# Patient Record
Sex: FEMALE | Race: BLACK OR AFRICAN AMERICAN | NOT HISPANIC OR LATINO | Employment: OTHER | URBAN - METROPOLITAN AREA
[De-identification: names, ages, dates, MRNs, and addresses within clinical notes are randomized per-mention and may not be internally consistent; named-entity substitution may affect disease eponyms.]

---

## 2018-08-18 PROBLEM — T14.8XXA DEEP TISSUE INJURY: Status: ACTIVE | Noted: 2018-08-18

## 2018-08-18 PROBLEM — L89.154 SACRAL DECUBITUS ULCER, STAGE IV: Status: ACTIVE | Noted: 2018-08-18

## 2018-08-18 PROBLEM — E11.9 TYPE 2 DIABETES MELLITUS: Status: ACTIVE | Noted: 2018-08-18

## 2018-08-18 PROBLEM — L89.154 DECUBITUS ULCER OF SACRAL REGION, STAGE 4: Status: ACTIVE | Noted: 2018-08-18

## 2018-08-18 PROBLEM — L89.90 DECUBITUS ULCER: Status: ACTIVE | Noted: 2018-08-18

## 2018-08-18 PROBLEM — E43 SEVERE MALNUTRITION: Status: ACTIVE | Noted: 2018-08-18

## 2018-08-18 PROBLEM — R53.81 PHYSICAL DECONDITIONING: Status: ACTIVE | Noted: 2018-08-18

## 2018-08-18 PROBLEM — M86.9 OSTEOMYELITIS: Status: ACTIVE | Noted: 2018-08-18

## 2018-08-18 PROBLEM — E78.49 OTHER HYPERLIPIDEMIA: Status: ACTIVE | Noted: 2018-08-18

## 2018-08-18 PROBLEM — S90.821A: Status: ACTIVE | Noted: 2018-08-18

## 2018-08-18 PROBLEM — E03.8 OTHER SPECIFIED HYPOTHYROIDISM: Status: ACTIVE | Noted: 2018-08-18

## 2018-08-18 PROBLEM — A41.89 SEPSIS DUE TO OTHER ETIOLOGY: Status: ACTIVE | Noted: 2018-08-18

## 2018-08-18 PROBLEM — L89.892: Status: ACTIVE | Noted: 2018-08-18

## 2018-08-18 PROBLEM — Z93.1 S/P PERCUTANEOUS ENDOSCOPIC GASTROSTOMY (PEG) TUBE PLACEMENT: Status: ACTIVE | Noted: 2018-08-08

## 2018-08-18 PROBLEM — E83.39 HYPOPHOSPHATEMIA: Status: ACTIVE | Noted: 2018-08-18

## 2018-08-18 PROBLEM — E11.40 DIABETIC NEUROPATHY ASSOCIATED WITH TYPE 2 DIABETES MELLITUS: Status: ACTIVE | Noted: 2018-08-18

## 2018-08-18 PROBLEM — I10 ESSENTIAL HYPERTENSION: Status: ACTIVE | Noted: 2018-08-18

## 2018-08-18 PROBLEM — F32.A DEPRESSION: Chronic | Status: ACTIVE | Noted: 2018-08-18

## 2018-08-18 PROBLEM — Z93.3 STATUS POST COLOSTOMY: Status: ACTIVE | Noted: 2018-08-08

## 2018-08-18 PROBLEM — R91.8 OPACITY OF LUNG ON IMAGING STUDY: Status: ACTIVE | Noted: 2018-08-18

## 2018-08-18 PROBLEM — F03.90 DEMENTIA WITHOUT BEHAVIORAL DISTURBANCE: Status: ACTIVE | Noted: 2018-08-18

## 2018-08-19 PROBLEM — E83.42 HYPOMAGNESEMIA: Status: ACTIVE | Noted: 2018-08-19

## 2018-08-19 PROBLEM — E87.6 HYPOKALEMIA: Status: ACTIVE | Noted: 2018-08-19

## 2018-08-20 PROBLEM — L89.313: Status: ACTIVE | Noted: 2018-08-20

## 2018-08-20 PROBLEM — L89.90 PRESSURE ULCER: Status: ACTIVE | Noted: 2018-08-20

## 2018-08-21 PROBLEM — L89.314 DECUBITUS ULCER OF RIGHT ISCHIUM, STAGE 4: Status: ACTIVE | Noted: 2018-08-20

## 2018-08-21 PROBLEM — L89.894: Status: ACTIVE | Noted: 2018-08-18

## 2018-08-22 PROBLEM — B35.3 TINEA PEDIS: Status: ACTIVE | Noted: 2018-08-22

## 2018-08-23 PROBLEM — E87.1 HYPONATREMIA: Status: ACTIVE | Noted: 2018-08-23

## 2018-08-24 PROBLEM — R68.89 SUSPECTED DEEP TISSUE INJURY OF UNKNOWN DEPTH OF HEEL: Status: ACTIVE | Noted: 2018-08-24

## 2018-08-27 PROBLEM — L89.313: Status: ACTIVE | Noted: 2018-08-27

## 2018-09-04 PROBLEM — E87.0 HYPERNATREMIA: Status: ACTIVE | Noted: 2018-09-04

## 2018-09-07 PROBLEM — D64.9 ANEMIA: Status: ACTIVE | Noted: 2018-09-07

## 2018-09-13 PROBLEM — L89.95 UNSTAGEABLE PRESSURE INJURY OF SKIN AND TISSUE: Status: ACTIVE | Noted: 2018-09-13

## 2018-09-25 ENCOUNTER — HOSPITAL ENCOUNTER (INPATIENT)
Facility: HOSPITAL | Age: 78
LOS: 3 days | Discharge: HOME | DRG: 698 | End: 2018-09-28
Attending: EMERGENCY MEDICINE | Admitting: INTERNAL MEDICINE
Payer: MEDICARE

## 2018-09-25 DIAGNOSIS — R41.82 ALTERED MENTAL STATUS, UNSPECIFIED ALTERED MENTAL STATUS TYPE: ICD-10-CM

## 2018-09-25 DIAGNOSIS — R53.1 WEAKNESS: ICD-10-CM

## 2018-09-25 DIAGNOSIS — Z66 DNR (DO NOT RESUSCITATE): ICD-10-CM

## 2018-09-25 DIAGNOSIS — G93.41 ACUTE METABOLIC ENCEPHALOPATHY: ICD-10-CM

## 2018-09-25 DIAGNOSIS — A41.9 SEPSIS, DUE TO UNSPECIFIED ORGANISM: ICD-10-CM

## 2018-09-25 DIAGNOSIS — N39.0 URINARY TRACT INFECTION WITHOUT HEMATURIA, SITE UNSPECIFIED: Primary | ICD-10-CM

## 2018-09-25 LAB
ALBUMIN SERPL BCP-MCNC: 2.3 G/DL
ALP SERPL-CCNC: 85 U/L
ALT SERPL W/O P-5'-P-CCNC: 25 U/L
ANION GAP SERPL CALC-SCNC: 8 MMOL/L
AST SERPL-CCNC: 26 U/L
BACTERIA #/AREA URNS AUTO: ABNORMAL /HPF
BASOPHILS # BLD AUTO: 0.03 K/UL
BASOPHILS NFR BLD: 0.4 %
BILIRUB SERPL-MCNC: 0.3 MG/DL
BILIRUB UR QL STRIP: NEGATIVE
BUN SERPL-MCNC: 31 MG/DL
CALCIUM SERPL-MCNC: 9.6 MG/DL
CHLORIDE SERPL-SCNC: 96 MMOL/L
CLARITY UR REFRACT.AUTO: ABNORMAL
CO2 SERPL-SCNC: 25 MMOL/L
COLOR UR AUTO: ABNORMAL
CREAT SERPL-MCNC: 0.7 MG/DL
CRP SERPL-MCNC: 72.7 MG/L
DIFFERENTIAL METHOD: ABNORMAL
EOSINOPHIL # BLD AUTO: 0.3 K/UL
EOSINOPHIL NFR BLD: 3.7 %
ERYTHROCYTE [DISTWIDTH] IN BLOOD BY AUTOMATED COUNT: 17.6 %
ERYTHROCYTE [SEDIMENTATION RATE] IN BLOOD BY WESTERGREN METHOD: 65 MM/HR
EST. GFR  (AFRICAN AMERICAN): >60 ML/MIN/1.73 M^2
EST. GFR  (NON AFRICAN AMERICAN): >60 ML/MIN/1.73 M^2
GLUCOSE SERPL-MCNC: 112 MG/DL
GLUCOSE UR QL STRIP: NEGATIVE
HCT VFR BLD AUTO: 31.4 %
HGB BLD-MCNC: 9.9 G/DL
HGB UR QL STRIP: NEGATIVE
IMM GRANULOCYTES # BLD AUTO: 0.03 K/UL
IMM GRANULOCYTES NFR BLD AUTO: 0.4 %
KETONES UR QL STRIP: NEGATIVE
LACTATE SERPL-SCNC: 0.7 MMOL/L
LACTATE SERPL-SCNC: 0.9 MMOL/L
LEUKOCYTE ESTERASE UR QL STRIP: ABNORMAL
LYMPHOCYTES # BLD AUTO: 1.5 K/UL
LYMPHOCYTES NFR BLD: 18.4 %
MCH RBC QN AUTO: 27.7 PG
MCHC RBC AUTO-ENTMCNC: 31.5 G/DL
MCV RBC AUTO: 88 FL
MICROSCOPIC COMMENT: ABNORMAL
MONOCYTES # BLD AUTO: 0.9 K/UL
MONOCYTES NFR BLD: 11.6 %
NEUTROPHILS # BLD AUTO: 5.2 K/UL
NEUTROPHILS NFR BLD: 65.5 %
NITRITE UR QL STRIP: NEGATIVE
NRBC BLD-RTO: 0 /100 WBC
PH UR STRIP: 6 [PH] (ref 5–8)
PLATELET # BLD AUTO: 352 K/UL
PMV BLD AUTO: 9.4 FL
POCT GLUCOSE: 112 MG/DL (ref 70–110)
POTASSIUM SERPL-SCNC: 4.5 MMOL/L
PROCALCITONIN SERPL IA-MCNC: 0.08 NG/ML
PROT SERPL-MCNC: 6.8 G/DL
PROT UR QL STRIP: NEGATIVE
RBC # BLD AUTO: 3.58 M/UL
RBC #/AREA URNS AUTO: 5 /HPF (ref 0–4)
SODIUM SERPL-SCNC: 129 MMOL/L
SP GR UR STRIP: 1.01 (ref 1–1.03)
SQUAMOUS #/AREA URNS AUTO: 0 /HPF
URN SPEC COLLECT METH UR: ABNORMAL
UROBILINOGEN UR STRIP-ACNC: NEGATIVE EU/DL
WBC # BLD AUTO: 7.94 K/UL
WBC #/AREA URNS AUTO: >100 /HPF (ref 0–5)
WBC CLUMPS UR QL AUTO: ABNORMAL

## 2018-09-25 PROCEDURE — 81001 URINALYSIS AUTO W/SCOPE: CPT

## 2018-09-25 PROCEDURE — 25000003 PHARM REV CODE 250: Performed by: PHYSICIAN ASSISTANT

## 2018-09-25 PROCEDURE — 96365 THER/PROPH/DIAG IV INF INIT: CPT

## 2018-09-25 PROCEDURE — 80053 COMPREHEN METABOLIC PANEL: CPT

## 2018-09-25 PROCEDURE — 93010 ELECTROCARDIOGRAM REPORT: CPT | Mod: ,,, | Performed by: INTERNAL MEDICINE

## 2018-09-25 PROCEDURE — 85025 COMPLETE CBC W/AUTO DIFF WBC: CPT

## 2018-09-25 PROCEDURE — 25000003 PHARM REV CODE 250: Performed by: EMERGENCY MEDICINE

## 2018-09-25 PROCEDURE — 94761 N-INVAS EAR/PLS OXIMETRY MLT: CPT

## 2018-09-25 PROCEDURE — 83605 ASSAY OF LACTIC ACID: CPT

## 2018-09-25 PROCEDURE — 87077 CULTURE AEROBIC IDENTIFY: CPT

## 2018-09-25 PROCEDURE — 63600175 PHARM REV CODE 636 W HCPCS: Performed by: PHYSICIAN ASSISTANT

## 2018-09-25 PROCEDURE — 99285 EMERGENCY DEPT VISIT HI MDM: CPT | Mod: 25

## 2018-09-25 PROCEDURE — 63600175 PHARM REV CODE 636 W HCPCS: Performed by: INTERNAL MEDICINE

## 2018-09-25 PROCEDURE — 87086 URINE CULTURE/COLONY COUNT: CPT

## 2018-09-25 PROCEDURE — 96375 TX/PRO/DX INJ NEW DRUG ADDON: CPT

## 2018-09-25 PROCEDURE — 96361 HYDRATE IV INFUSION ADD-ON: CPT

## 2018-09-25 PROCEDURE — 86140 C-REACTIVE PROTEIN: CPT

## 2018-09-25 PROCEDURE — 82962 GLUCOSE BLOOD TEST: CPT

## 2018-09-25 PROCEDURE — 99223 1ST HOSP IP/OBS HIGH 75: CPT | Mod: AI,,, | Performed by: INTERNAL MEDICINE

## 2018-09-25 PROCEDURE — 87040 BLOOD CULTURE FOR BACTERIA: CPT

## 2018-09-25 PROCEDURE — 84145 PROCALCITONIN (PCT): CPT

## 2018-09-25 PROCEDURE — 85652 RBC SED RATE AUTOMATED: CPT

## 2018-09-25 PROCEDURE — 87186 SC STD MICRODIL/AGAR DIL: CPT

## 2018-09-25 PROCEDURE — 99291 CRITICAL CARE FIRST HOUR: CPT | Mod: ,,, | Performed by: PHYSICIAN ASSISTANT

## 2018-09-25 PROCEDURE — 87088 URINE BACTERIA CULTURE: CPT

## 2018-09-25 PROCEDURE — 12000002 HC ACUTE/MED SURGE SEMI-PRIVATE ROOM

## 2018-09-25 PROCEDURE — 96366 THER/PROPH/DIAG IV INF ADDON: CPT

## 2018-09-25 RX ORDER — CEFTRIAXONE 1 G/1
1 INJECTION, POWDER, FOR SOLUTION INTRAMUSCULAR; INTRAVENOUS
Status: DISCONTINUED | OUTPATIENT
Start: 2018-09-25 | End: 2018-09-28

## 2018-09-25 RX ORDER — VANCOMYCIN 2 GRAM/500 ML IN 0.9 % SODIUM CHLORIDE INTRAVENOUS
2000
Status: COMPLETED | OUTPATIENT
Start: 2018-09-25 | End: 2018-09-25

## 2018-09-25 RX ORDER — CEFEPIME HYDROCHLORIDE 2 G/1
2 INJECTION, POWDER, FOR SOLUTION INTRAVENOUS
Status: COMPLETED | OUTPATIENT
Start: 2018-09-25 | End: 2018-09-25

## 2018-09-25 RX ADMIN — VANCOMYCIN HYDROCHLORIDE 2000 MG: 10 INJECTION, POWDER, LYOPHILIZED, FOR SOLUTION INTRAVENOUS at 01:09

## 2018-09-25 RX ADMIN — CEFTRIAXONE SODIUM 1 G: 1 INJECTION, POWDER, FOR SOLUTION INTRAMUSCULAR; INTRAVENOUS at 10:09

## 2018-09-25 RX ADMIN — CEFEPIME 2 G: 2 INJECTION, POWDER, FOR SOLUTION INTRAVENOUS at 01:09

## 2018-09-25 RX ADMIN — SODIUM CHLORIDE 1851 ML: 0.9 INJECTION, SOLUTION INTRAVENOUS at 01:09

## 2018-09-25 NOTE — ED NOTES
Pt remains sleepy, opens eyes briefly to verbal stimuli.  Pt remains on monitor.  IV fluids & antibiotics infusing without diffiulty.

## 2018-09-25 NOTE — MEDICAL/APP STUDENT
Hospital Medicine  History and Physical Exam    [place] [facility] [place] Devi Ospina MD  Admit Date: 9/25/2018  ELIZA 10/2/2018  Chief complaint: decreased mental status  Patient information was obtained from nursing home and ER records.   Primary care Physician: Moriah Bernal MD  Code status: DNR (Do Not Resuscitate)    HPI:   Pt is a 78 year old bedbound (~2 months) female with a history of dementia, DM2, osteoarthritis, and pressure ulcers (including sacral and lower extremities) with altered mental status. Per her night nurse (Julian Chand, [facility]), her mouth was frothy and foaming overnight, with worsening secretions throughout the day requiring suctioning. BP was 96/48, while breathing was unlabored and O2 saturation normal. Pt was disoriented but verbally responsive yesterday afternoon. Pt was recently hospitalized at [facility] (starting on 8/3/2018) for sepsis secondary to a stage 4 sacral decubitus with purulent drainage and connection to the anus. She received debridement and was treated with vancomycin (discontinued) and zosyn (4.5 grams Q8h for 6 weeks). Had a PICC line placed on 8/17 for IV antibiotics at LTAC. Nursing home denies any recent fever, chills, chest pain, abdominal pain or diarrhea. Other than the increased oral secretions, she has no other respiratory signs including cough or tachypnea or tachycardia. Pt has chronic indwelling dietrich catheter due to non-healing sacral decubitus.    Prior to recent hospitalization, she was cared at home by adult children. Discharged to LTAC for care of sacral decubitus ulcer from 8/17. After was discharged to [facility] to detention bed on 9/20/18. Had completed IV antibiotics by that time.     Past Medical History: Patient has a past medical history of Arthritis, Dementia without behavioral disturbance (8/18/2018), Depression (8/18/2018), Diabetes mellitus, Essential hypertension (8/18/2018), Hypertension, Other hyperlipidemia  (8/18/2018), Other specified hypothyroidism (8/18/2018), S/P percutaneous endoscopic gastrostomy (PEG) tube placement (8/18/2018), Status post diverting loop colostomy (8/8/2018), Thyroid disease, and Type 2 diabetes mellitus (8/18/2018).    Past Surgical History: Patient has no past surgical history on file.    Social History: Patient     Family History: Family history is unknown by patient.    Medications:   Prior to Admission medications    Medication Sig Start Date End Date Taking? Authorizing Provider   acetaminophen (TYLENOL) 650 mg/20.3 mL Soln 20.3 mLs (650 mg total) by Per G Tube route every 6 (six) hours as needed. 9/20/18   Zoya Mena NP   albuterol sulfate 90 mcg/actuation AePB Inhale 1 puff into the lungs every 4 (four) hours as needed (shortness of breath). Rescue 6/25/17   Elaine Bee MD   albuterol-ipratropium (DUO-NEB) 2.5 mg-0.5 mg/3 mL nebulizer solution Inhale one vial by nebulization route every 4 (four) hours while awake. Rescue 9/20/18 9/20/19  Zoya Mena NP   albuterol-ipratropium (DUO-NEB) 2.5 mg-0.5 mg/3 mL nebulizer solution Inhale one vial by nebulization route every 6 (six) hours as needed for Wheezing. Rescue 9/20/18 9/20/19  Zoya Mena NP   amLODIPine (NORVASC) 10 MG tablet 1 tablet (10 mg total) by Per G Tube route once daily. 9/20/18   Zoya Mena NP   atorvastatin (LIPITOR) 10 MG tablet 1 tablet (10 mg total) by Per G Tube route every evening. 9/20/18 9/20/19  Zoya Mena NP   cilostazol (PLETAL) 50 MG Tab Take 50 mg by mouth 2 (two) times daily.    Historical Provider, MD   docusate sodium (COLACE) 100 MG capsule 1 capsule (100 mg total) by Per G Tube route 2 (two) times daily. 9/20/18   Zoya Mena NP   escitalopram oxalate (LEXAPRO) 10 MG tablet 1 tablet (10 mg total) by Per G Tube route once daily. 9/20/18 9/20/19  Zoya Mena, NP   famotidine (PEPCID) 20 MG tablet 1 tablet (20 mg total) by Per G Tube route 2 (two) times daily.  9/20/18 9/20/19  Zoya Mena NP   gabapentin (NEURONTIN) 300 MG capsule 1 capsule (300 mg total) by Per G Tube route 3 (three) times daily. 9/20/18 9/20/19  Zoya Mena NP   glucosamine-chondroitin 500-400 mg tablet 1 tablet by Per G Tube route 3 (three) times daily. 9/20/18   Zoya Mena NP   guaifenesin 100 mg/5 ml (ROBITUSSIN) 100 mg/5 mL syrup 10 mLs (200 mg total) by Per G Tube route 4 (four) times daily as needed for Cough or Congestion. 9/20/18 9/30/18  Zoya Mena NP   hydrALAZINE (APRESOLINE) 100 MG tablet Take 1 tablet (100 mg total) by mouth every 8 (eight) hours. 9/20/18 9/20/19  Zoya Mena NP   hydroCHLOROthiazide (HYDRODIURIL) 25 MG tablet 1 tablet (25 mg total) by Per G Tube route 2 (two) times daily. 9/20/18 9/20/19  oZya Mena NP   HYDROcodone-acetaminophen (NORCO) 5-325 mg per tablet 1 tablet by Per G Tube route every 6 (six) hours as needed for Pain. 9/20/18   Zoya Mena NP   insulin aspart U-100 (NOVOLOG) 100 unit/mL InPn pen Inject 0-5 Units into the skin every 6 (six) hours as needed (Hyperglycemia). 9/20/18 9/20/19  Zoya Mena NP   ketoconazole (NIZORAL) 2 % cream Apply topically 2 (two) times daily. 9/20/18   Zoya Mena NP   levothyroxine (SYNTHROID) 125 MCG tablet 1 tablet (125 mcg total) by Per G Tube route before breakfast. 9/21/18 9/21/19  Zoya Mena NP   lisinopril (PRINIVIL,ZESTRIL) 40 MG tablet 1 tablet (40 mg total) by Per G Tube route once daily. 9/20/18 9/20/19  Zoya Mnea NP   lovastatin (MEVACOR) 20 MG tablet Take 20 mg by mouth every evening. 7/18/18   Salvador Terrell MD   magnesium oxide (MAG-OX) 400 mg tablet 1 tablet (400 mg total) by Per G Tube route 3 (three) times daily. 9/20/18   Zoya Mena NP   metoprolol tartrate (LOPRESSOR) 50 MG tablet 1 tablet (50 mg total) by Per G Tube route 2 (two) times daily. 9/20/18 9/20/19  Zoya Mena NP   multivitamin (THERAGRAN) per tablet 1 tablet  by Per G Tube route once daily. 9/20/18   Zoya Mena, NP   potassium chloride 10% (KAYCIEL) 20 mEq/15 mL oral solution 15 mLs (20 mEq total) by Per G Tube route once daily. 9/20/18   Zoya Mena NP   potassium, sodium phosphates (PHOS-NAK) 280-160-250 mg PwPk 2 packets by Per G Tube route 3 (three) times daily before meals. 9/20/18   Zoya Mena NP   senna-docusate 8.6-50 mg (PERICOLACE) 8.6-50 mg per tablet 2 tablets by Per G Tube route once daily. 9/20/18   Zoya Mena NP   traMADol (ULTRAM) 50 mg tablet 1 tablet (50 mg total) by Per G Tube route every 8 (eight) hours. for 10 days 9/20/18 9/30/18  Zoya Mena NP   vitamin D (VITAMIN D3) 1000 units Tab Take 2 tablets (2,000 Units total) by mouth once daily. 9/20/18   Zoya Mena NP       Allergies: Patient has No Known Allergies.    Reported by nursing home, otherwise unable to obtain due to patient's mental status.  Review of Systems   Respiratory: Negative for cough.      PEx  Temp:  [98.1 °F (36.7 °C)-98.2 °F (36.8 °C)]   Pulse:  [74-84]   Resp:  [11-24]   BP: (111-175)/(56-80)   SpO2:  [94 %-98 %]   Body mass index is 21.95 kg/m².     Physical Exam   Constitutional: She is well-developed, well-nourished, and in no distress. No distress.   Frail appearing elderly lady   HENT:   Clear sputum foaming at mouth   Pulmonary/Chest:   Bilateral coarse upper airway signs (snoring)   Neurological:   Somnolent, arouses to sternal rub only and falls back asleep, unable to follow commands     No intake or output data in the 24 hours ending 09/25/18 2204  Recent Labs   Lab  09/19/18   0433  09/20/18   0426  09/25/18   1220   WBC  7.97  8.36  7.94   HGB  10.1*  10.3*  9.9*   HCT  33.6*  32.6*  31.4*   PLT  471*  432*  352*     Recent Labs   Lab  09/19/18   0433  09/20/18   0426  09/25/18   1220   NA  138  136  129*   K  4.0  4.1  4.5   CL  106  103  96   CO2  26  25  25   BUN  32*  26*  31*   CREATININE  0.7  0.7  0.7   GLU  162*  131*   112*   CALCIUM  9.2  9.1  9.6   MG  1.7  1.8   --    PHOS  3.3  3.1   --      Recent Labs   Lab  09/19/18   0433  09/20/18   0426  09/25/18   1220   ALKPHOS  72  75  85   ALT  22 19 25   AST  19 17 26   ALBUMIN  2.0*  2.0*  2.3*   PROT  6.5  6.4  6.8   BILITOT  0.2  0.2  0.3      Recent Labs   Lab  09/19/18   0642  09/19/18   1205  09/19/18   1650  09/19/18   2349  09/20/18   0557  09/25/18   1322   POCTGLUCOSE  178*  147*  143*  147*  148*  112*     No results for input(s): CPK, CPKMB, MB, TROPONINI in the last 72 hours.    Recent Labs      09/25/18   1651   LACTATE  0.7        Active Hospital Problems    Diagnosis  POA    Urinary tract infection without hematuria [N39.0]  Yes      Resolved Hospital Problems   No resolved problems to display.     CT head without contrast (9/25)  No acute large vascular territory infarct or intracranial hemorrhage identified.  Further evaluation/follow-up as warranted.    Right caudate suspected remote lacunar type infarct.  Additional more subtle low-attenuation foci at the anterior limb left internal capsule, right tonya and bilateral thalami concerning for age-indeterminate lacunar type infarcts.  Correlate clinically.    Mild senescent changes as above.    CXR (9/25)  Cardiac size is normal.  Vascular catheter is seen with its tip in the superior vena cava.  Lungs are well aerated and no significant infiltrate is noted.  Minimally increased markings seen at the left lung base.    Overview  Pt is a 78 year old female with a colostomy, PEG tube, multiple ulcers and sacral decubitus ulcer with recent hospitalization who is being treated for sepsis.    Assessment and Plan for Problems addressed today:    Acute metabolic encephalopathy on chronic dementia  UTI due to chronic indwelling dietrich  hyponatremia  · Stop BID HCTZ and hold free water flushes  · Urine sodium, urine creatinine, urine osmolality, serum osmolality  · UA positive for pyuria and bacteruria  · CT is remarkable  in multiple areas in age-indeterminate remote lacunar infarct, right tonya and bilateral thalami, no acute large vascular territory infarct (9/25)  · Encephalopathy likely secondary to UTI  · Ceftriaxone 1 g IV QD (until cultures result)  · Delirium precautions  · No SIRS - give IV fluids bolus, cefepime and vancomycin  · Blood cultures drawn (CXR unremarkable)    Recent Osteomyelitis of sacrum  stage 4 sacral decubitus ulcers  multiple decubitus ulcers, bilateral lower extremities   · Ulcers at right shin, right lateral leg, right heel, right lateral malleolus, left heel, left distal leg, left lateral heel  · Pt had received daily wound care at nursing home.  · Completed 6 weeks of antibiotics  · CRP 72.7 ESR 65  · Wound care consult    S/p percutaneous endoscopic gastrostomy tube placement   post diverting loop colostomy  · PEG and loop colostomy placed for poor nutritional status and wound healing (8/8/18)  · PEG site care  · Colostomy care  · Pt was to follow up at trauma surgery clinic at [facility] for removal of abdominal bar beneath colostomy 2 weeks from admission to LTAC (near end of August 2018)    Diabetes mellitus type 2  · HbA1c 5.4 (7/12/18)  · Routine serum glucoses in low 100s    HLD  · Atorvastatin 10 mg daily    Essential hypertension  · lisinopril 40 mg daily, metoprolol 50 mg BID and amlodipine 10 mg daily  · Holding hydralazine 100 mg TID for now  · Stop HCTZ 25 mg BID due to hyponatremia    Depression  · lexapro 10 mg daily    Osteoarthritis  · PRN acetaminophen    Anemia of chronic infection      DVT PPx:   VTE Risk Mitigation (From admission, onward)        Ordered     enoxaparin injection 40 mg  Daily      09/26/18 0444     IP VTE HIGH RISK PATIENT  Once      09/25/18 1438          Provider Devi Ospina MD      I personally scribed for Devi Ospina MD on 09/25/2018 at 2:23 PM. Electronically signed by arti Ontiveros on 09/25/2018 at 2:23 PM

## 2018-09-25 NOTE — ED TRIAGE NOTES
Pt brought in by EMS from [facility] for altered mental status.  EMS states nurse who gave them report was unsure how long pt had been this way.  Pt arrives with dietrich, PICC line and PEG tube.  Pt was r/o for stroke code by Dr. العراقي per Ramon RN

## 2018-09-25 NOTE — ED NOTES
Appearance:  Pt lethargic.  Moans intermittently.    Skin:  Skin warm, dry & intact.  Mucous membranes dry.   Respiratory:  Respirations sound wet.  Neurologic:    Sensation intact.   Following simple commands.  Unable to answer any questions.  Responds to sternal rub.  Peripheral Vascular:  Radial pulses present   Bilateral marilu boots.   Abdomen:  Abdomen soft.  Colostomy in place.  Lewis catheter in place. PEG tube in place

## 2018-09-25 NOTE — ED PROVIDER NOTES
Encounter Date: 9/25/2018    SCRIBE #1 NOTE: I, Son Ruth, am scribing for, and in the presence of,  Dr. العراقي . I have scribed the following portions of the note -       History     Chief Complaint   Patient presents with    Altered Mental Status     pt responds to verbal stimuli, does not maintain alert or answer questions, moans only, baseline unknown     This is a 78 year old female with a PMH of HTN, DM, recent admission for sepsis secondary to a sacral decubitus ulcer s/p surgical debridement 8/3/18, underwent diverting loop colostomy and peg tube placement on 8/8/18 to encourage wound healing given her poor nutritional status. She was at [facility] until she was discharged to [facility] on 9/20/18. Patient arrived to the ED via EMS today obtunded. Report from patient's nurse Julian Chand LPN at [facility] is as follows:    At shift change this morning the overnight night staff reported frothy oral secretions. Despite suctioning, her nurse noted worsening secretions throughout the day, coarse breath sounds, and a decline in her mental status. While the patient was noted to have nonlabored breathing and normal oxygen saturation she was hypotensive with BP 96/48. This concerned the nursing staff with regard to her respiratory status. She cramer shave a PICC line in place, which is not currently being used to administer antibiotics but her nurse reports she was able to flush the line yesterday without difficulty. She did note that she was unable to flush the PICC line this morning. With respect to her mental status, she notes that the patient has been disoriented but was verbally responsive yesterday afternoon. Her family has been notified by the nursing staff of her transfer to the hospital. There have been no documented fevers or additional complaints.    Patient arrives with [employer] document detailing her DNR status.          Review of patient's allergies indicates:  No Known Allergies  Past  Medical History:   Diagnosis Date    Arthritis     osteoarthritis    Dementia without behavioral disturbance 8/18/2018    Depression 8/18/2018    Diabetes mellitus     Essential hypertension 8/18/2018    Hypertension     Other hyperlipidemia 8/18/2018    Other specified hypothyroidism 8/18/2018    S/P percutaneous endoscopic gastrostomy (PEG) tube placement 8/18/2018    Status post diverting loop colostomy 8/8/2018    Thyroid disease     Type 2 diabetes mellitus 8/18/2018     History reviewed. No pertinent surgical history.  Family History   Family history unknown: Yes     Social History     Tobacco Use    Smoking status: Unknown If Ever Smoked    Tobacco comment: unable to assess d/t pt poor historian   Substance Use Topics    Alcohol use: Not on file     Comment: unable to assess d/t pt poor historian    Drug use: Not on file     Comment: unable to assess d/t pt poor historian     Review of Systems   Unable to perform ROS: Mental status change       Physical Exam     Initial Vitals [09/25/18 1211]   BP Pulse Resp Temp SpO2   (!) 111/57 74 (!) 24 98.1 °F (36.7 °C) 97 %      MAP       --         Physical Exam    Constitutional: She appears well-developed and well-nourished. She appears lethargic. She appears ill.   HENT:   Head: Atraumatic.   Thick frothy oral secretions.   Eyes: Conjunctivae and EOM are normal. Pupils are equal, round, and reactive to light.   Cardiovascular: Normal rate, regular rhythm and normal heart sounds.   Pulmonary/Chest: Right breast exhibits mass (4cm firm, warm mass of the right breast at 9 o'clock).   Abdominal: Soft. Bowel sounds are normal. There is no tenderness. There is no rigidity, no rebound, no guarding and no CVA tenderness.       Neurological: She appears lethargic. She is disoriented. GCS eye subscore is 2. GCS verbal subscore is 3. GCS motor subscore is 4.   Responds to painful stimuli.   One word verbal responses to questions on subsequent reassessment.    Skin: Skin is warm and dry.   Multiple wounds of the lower extremities, sacrum decubitus.         ED Course   Procedures  Labs Reviewed   CBC W/ AUTO DIFFERENTIAL - Abnormal; Notable for the following components:       Result Value    RBC 3.58 (*)     Hemoglobin 9.9 (*)     Hematocrit 31.4 (*)     MCHC 31.5 (*)     RDW 17.6 (*)     Platelets 352 (*)     All other components within normal limits   COMPREHENSIVE METABOLIC PANEL - Abnormal; Notable for the following components:    Sodium 129 (*)     Glucose 112 (*)     BUN, Bld 31 (*)     Albumin 2.3 (*)     All other components within normal limits   URINALYSIS, REFLEX TO URINE CULTURE - Abnormal; Notable for the following components:    Appearance, UA Cloudy (*)     Leukocytes, UA 3+ (*)     All other components within normal limits    Narrative:     Preferred Collection Type->Urine, Catheterized   URINALYSIS MICROSCOPIC - Abnormal; Notable for the following components:    RBC, UA 5 (*)     WBC, UA >100 (*)     Bacteria, UA Moderate (*)     All other components within normal limits    Narrative:     Preferred Collection Type->Urine, Catheterized   POCT GLUCOSE - Abnormal; Notable for the following components:    POCT Glucose 112 (*)     All other components within normal limits   CULTURE, BLOOD   CULTURE, BLOOD   CULTURE, URINE   LACTIC ACID, PLASMA   POCT GLUCOSE MONITORING CONTINUOUS        ECG Results          EKG 12-lead (Final result)  Result time 09/25/18 13:53:25    Final result by Interface, Lab In [facility] (09/25/18 13:53:25)                 Narrative:    Test Reason : R53.1,  Blood Pressure : 137/063 mmHG  Vent. Rate : 076 BPM     Atrial Rate : 076 BPM     P-R Int : 196 ms          QRS Dur : 130 ms      QT Int : 402 ms       P-R-T Axes : 077 072 072 degrees     QTc Int : 452 ms    Normal sinus rhythm  Right bundle branch block  Abnormal ECG  No previous ECGs available  Confirmed by Bruno Lugo MD ([id number]) on 9/25/2018 1:53:11 PM    Referred By: KARSTEN  TEX           Confirmed By:Bruno Lugo MD                            Imaging Results          CT Head Without Contrast (Final result)  Result time 09/25/18 13:54:13    Final result by Rayshawn Mayers MD (09/25/18 13:54:13)                 Impression:      No acute large vascular territory infarct or intracranial hemorrhage identified.  Further evaluation/follow-up as warranted.    Right caudate suspected remote lacunar type infarct.  Additional more subtle low-attenuation foci at the anterior limb left internal capsule, right tonya and bilateral thalami concerning for age-indeterminate lacunar type infarcts.  Correlate clinically.    Mild senescent changes as above.      Electronically signed by: Rayshawn Mayers MD  Date:    09/25/2018  Time:    13:54             Narrative:    EXAMINATION:  CT HEAD WITHOUT CONTRAST    CLINICAL HISTORY:  Confusion/delirium, altered LOC, unexplained;    TECHNIQUE:  Low dose axial CT images obtained throughout the head without intravenous contrast. Sagittal and coronal reconstructions were performed.    COMPARISON:  None.    FINDINGS:  Patient is slightly rotated and tilted within the scanner.  There is slight beam hardening with streak artifact.    Intracranial compartment:    Ventricles and sulci are normal in size for age without evidence of hydrocephalus. No extra-axial blood or fluid collections.    Brain appears normally formed.  There is mild generalized cerebral volume loss.  Mild degree of periventricular white matter low-attenuation changes likely sequela of chronic small vessel ischemic disease in this age group.  Small low-attenuation focus with slight volume loss at the right caudate suggesting a remote lacunar type infarct.  Subtle low-attenuation foci at the anterior limb left internal capsule, right tonya, and bilateral thalami concerning for age-indeterminate lacunar type infarcts.  No parenchymal mass, hemorrhage, edema or major vascular distribution infarct.   Calcific atherosclerosis of the bilateral cavernous ICAs.    Skull/extracranial contents (limited evaluation): No fracture. Mastoid air cells and paranasal sinuses are essentially clear.                               X-Ray Chest AP Portable (Final result)  Result time 09/25/18 13:02:37    Final result by Artem Veras MD (09/25/18 13:02:37)                 Impression:      See above      Electronically signed by: Artem Veras MD  Date:    09/25/2018  Time:    13:02             Narrative:    EXAMINATION:  XR CHEST AP PORTABLE    CLINICAL HISTORY:  Sepsis;    TECHNIQUE:  Single frontal view of the chest was performed.    COMPARISON:  09/14/2018    FINDINGS:  Cardiac size is normal.  Vascular catheter is seen with its tip in the superior vena cava.  Lungs are well aerated and no significant infiltrate is noted.  Minimally increased markings seen at the left lung base.                                       APC / Resident Notes:   78 year old female transferred from [facility] with altered mental status.    DDx includes but is not limited to sepsis, metabolic encephalopathy, CVA, medication reaction.    Labs with normal WBC 7.94, H&H 9.9/31, Na 129, Glucose 112, lactate wnl.  Urinalysis with 3+ leukocytes, > 100 WBCs.  Broad spectrum antibiotics given in the ED.  CT head with no acute findings. There is remote lacunar infarct.     She will be admitted. I discussed the care of this patient with my supervising MD.        Scribe Attestation:   Scribe #1: I performed the above scribed service and the documentation accurately describes the services I performed. I attest to the accuracy of the note.    Attending Attestation:     Physician Attestation Statement for NP/PA:   I have conducted a face to face encounter with this patient in addition to the NP/PA, due to Medical Complexity    Other NP/PA Attestation Additions:    History of Present Illness: Pt presents with lethargy and increased  respiratory secretion. She is a nursing resident and has problems with discharge. She is clearly a DNR. Her family was notified by the nursing facility and that she was coming to the hospital, but they have not arrived.    Physical Exam: She appears chronically ill. She is able to respond to some verb questions. Lungs are coarse no rales. Abdomen: non tender.    Medical Decision Making: Will do a septic evaluation, head CT, and admit to medicine.     Attending Critical Care:   Critical Care Times:   Direct Patient Care (initial evaluation, reassessments, and time considering the case)................................................................22 minutes.   Additional History from reviewing old medical records or taking additional history from the family, EMS, PCP, etc.......................3 minutes.   Ordering, Reviewing, and Interpreting Diagnostic Studies...............................................................................................................3 minutes.   Documentation..................................................................................................................................................................................3 minutes.   Consultation with other Physicians. .................................................................................................................................................3 minutes.   ==============================================================  · Total Critical Care Time - exclusive of procedural time: 34 minutes.  ==============================================================                 Clinical Impression:   The primary encounter diagnosis was Urinary tract infection without hematuria, site unspecified. Diagnoses of Weakness, Sepsis, due to unspecified organism, Altered mental status, unspecified altered mental status type, and DNR (do not resuscitate) were also pertinent to this visit.      Disposition:    Disposition: Admitted  Condition: Serious                        Rachel Cheung PA-C  09/25/18 1900       Carmine العراقي MD  09/26/18 [id number]

## 2018-09-25 NOTE — ED NOTES
Pt status unchanged.  Pt opens eyes to verbal stimuli.  Pt remains on monitoring devices.  Waiting on admission.  Will continue to monitor.

## 2018-09-26 PROBLEM — L89.95 UNSTAGEABLE PRESSURE ULCER: Status: ACTIVE | Noted: 2018-09-26

## 2018-09-26 PROBLEM — I12.9 TYPE 2 DIABETES MELLITUS WITH STAGE 2 CHRONIC KIDNEY DISEASE AND HYPERTENSION: Status: ACTIVE | Noted: 2018-08-18

## 2018-09-26 PROBLEM — E11.22 TYPE 2 DIABETES MELLITUS WITH STAGE 2 CHRONIC KIDNEY DISEASE AND HYPERTENSION: Status: ACTIVE | Noted: 2018-08-18

## 2018-09-26 PROBLEM — L89.154 PRESSURE ULCER OF COCCYGEAL REGION, STAGE 4: Status: ACTIVE | Noted: 2018-09-26

## 2018-09-26 PROBLEM — G93.41 ACUTE METABOLIC ENCEPHALOPATHY: Status: ACTIVE | Noted: 2018-09-26

## 2018-09-26 PROBLEM — Z97.8 CHRONIC INDWELLING FOLEY CATHETER: Status: ACTIVE | Noted: 2018-09-26

## 2018-09-26 PROBLEM — N18.2 TYPE 2 DIABETES MELLITUS WITH STAGE 2 CHRONIC KIDNEY DISEASE AND HYPERTENSION: Status: ACTIVE | Noted: 2018-08-18

## 2018-09-26 LAB
25(OH)D3+25(OH)D2 SERPL-MCNC: 34 NG/ML
ALBUMIN SERPL BCP-MCNC: 2.1 G/DL
ANION GAP SERPL CALC-SCNC: 7 MMOL/L
BASOPHILS # BLD AUTO: 0.06 K/UL
BASOPHILS NFR BLD: 0.7 %
BUN SERPL-MCNC: 21 MG/DL
CALCIUM SERPL-MCNC: 9.3 MG/DL
CHLORIDE SERPL-SCNC: 102 MMOL/L
CO2 SERPL-SCNC: 25 MMOL/L
CREAT SERPL-MCNC: 0.6 MG/DL
CREAT UR-MCNC: 19 MG/DL
DIFFERENTIAL METHOD: ABNORMAL
EOSINOPHIL # BLD AUTO: 0.5 K/UL
EOSINOPHIL NFR BLD: 6.7 %
ERYTHROCYTE [DISTWIDTH] IN BLOOD BY AUTOMATED COUNT: 17.4 %
EST. GFR  (AFRICAN AMERICAN): >60 ML/MIN/1.73 M^2
EST. GFR  (NON AFRICAN AMERICAN): >60 ML/MIN/1.73 M^2
GLUCOSE SERPL-MCNC: 105 MG/DL
HCT VFR BLD AUTO: 33.9 %
HGB BLD-MCNC: 10.7 G/DL
IMM GRANULOCYTES # BLD AUTO: 0.04 K/UL
IMM GRANULOCYTES NFR BLD AUTO: 0.5 %
LYMPHOCYTES # BLD AUTO: 1.7 K/UL
LYMPHOCYTES NFR BLD: 20.7 %
MAGNESIUM SERPL-MCNC: 1.6 MG/DL
MCH RBC QN AUTO: 27.9 PG
MCHC RBC AUTO-ENTMCNC: 31.6 G/DL
MCV RBC AUTO: 88 FL
MONOCYTES # BLD AUTO: 0.7 K/UL
MONOCYTES NFR BLD: 8.6 %
NEUTROPHILS # BLD AUTO: 5 K/UL
NEUTROPHILS NFR BLD: 62.8 %
NRBC BLD-RTO: 0 /100 WBC
OSMOLALITY SERPL: 287 MOSM/KG
OSMOLALITY UR: 368 MOSM/KG
PHOSPHATE SERPL-MCNC: 3.2 MG/DL
PLATELET # BLD AUTO: 386 K/UL
PMV BLD AUTO: 10 FL
POCT GLUCOSE: 83 MG/DL (ref 70–110)
POTASSIUM SERPL-SCNC: 4.2 MMOL/L
PREALB SERPL-MCNC: 17 MG/DL
RBC # BLD AUTO: 3.84 M/UL
SODIUM SERPL-SCNC: 134 MMOL/L
SODIUM UR-SCNC: 107 MMOL/L
T4 FREE SERPL-MCNC: 0.86 NG/DL
TSH SERPL DL<=0.005 MIU/L-ACNC: 27.73 UIU/ML
WBC # BLD AUTO: 8.01 K/UL

## 2018-09-26 PROCEDURE — 36415 COLL VENOUS BLD VENIPUNCTURE: CPT

## 2018-09-26 PROCEDURE — 84439 ASSAY OF FREE THYROXINE: CPT

## 2018-09-26 PROCEDURE — 63600175 PHARM REV CODE 636 W HCPCS: Performed by: INTERNAL MEDICINE

## 2018-09-26 PROCEDURE — 83735 ASSAY OF MAGNESIUM: CPT

## 2018-09-26 PROCEDURE — 97802 MEDICAL NUTRITION INDIV IN: CPT

## 2018-09-26 PROCEDURE — 82306 VITAMIN D 25 HYDROXY: CPT

## 2018-09-26 PROCEDURE — 11000001 HC ACUTE MED/SURG PRIVATE ROOM

## 2018-09-26 PROCEDURE — 99232 SBSQ HOSP IP/OBS MODERATE 35: CPT | Mod: ,,, | Performed by: INTERNAL MEDICINE

## 2018-09-26 PROCEDURE — 94761 N-INVAS EAR/PLS OXIMETRY MLT: CPT

## 2018-09-26 PROCEDURE — 84300 ASSAY OF URINE SODIUM: CPT

## 2018-09-26 PROCEDURE — 82570 ASSAY OF URINE CREATININE: CPT

## 2018-09-26 PROCEDURE — 84443 ASSAY THYROID STIM HORMONE: CPT

## 2018-09-26 PROCEDURE — 83930 ASSAY OF BLOOD OSMOLALITY: CPT

## 2018-09-26 PROCEDURE — 25000003 PHARM REV CODE 250: Performed by: INTERNAL MEDICINE

## 2018-09-26 PROCEDURE — 85025 COMPLETE CBC W/AUTO DIFF WBC: CPT

## 2018-09-26 PROCEDURE — 80069 RENAL FUNCTION PANEL: CPT

## 2018-09-26 PROCEDURE — 83935 ASSAY OF URINE OSMOLALITY: CPT

## 2018-09-26 PROCEDURE — 84134 ASSAY OF PREALBUMIN: CPT

## 2018-09-26 RX ORDER — LISINOPRIL 20 MG/1
40 TABLET ORAL DAILY
Status: DISCONTINUED | OUTPATIENT
Start: 2018-09-26 | End: 2018-09-28 | Stop reason: HOSPADM

## 2018-09-26 RX ORDER — AMLODIPINE BESYLATE 10 MG/1
10 TABLET ORAL DAILY
Status: DISCONTINUED | OUTPATIENT
Start: 2018-09-26 | End: 2018-09-28 | Stop reason: HOSPADM

## 2018-09-26 RX ORDER — CARVEDILOL 25 MG/1
25 TABLET ORAL 2 TIMES DAILY
Status: DISCONTINUED | OUTPATIENT
Start: 2018-09-26 | End: 2018-09-28 | Stop reason: HOSPADM

## 2018-09-26 RX ORDER — ENOXAPARIN SODIUM 100 MG/ML
40 INJECTION SUBCUTANEOUS EVERY 24 HOURS
Status: DISCONTINUED | OUTPATIENT
Start: 2018-09-26 | End: 2018-09-28 | Stop reason: HOSPADM

## 2018-09-26 RX ORDER — ATORVASTATIN CALCIUM 10 MG/1
10 TABLET, FILM COATED ORAL NIGHTLY
Status: DISCONTINUED | OUTPATIENT
Start: 2018-09-26 | End: 2018-09-28 | Stop reason: HOSPADM

## 2018-09-26 RX ORDER — CILOSTAZOL 50 MG/1
50 TABLET ORAL 2 TIMES DAILY
Status: DISCONTINUED | OUTPATIENT
Start: 2018-09-26 | End: 2018-09-28 | Stop reason: HOSPADM

## 2018-09-26 RX ORDER — ESCITALOPRAM OXALATE 10 MG/1
10 TABLET ORAL DAILY
Status: DISCONTINUED | OUTPATIENT
Start: 2018-09-26 | End: 2018-09-28 | Stop reason: HOSPADM

## 2018-09-26 RX ORDER — POTASSIUM CHLORIDE 20 MEQ/15ML
20 SOLUTION ORAL DAILY
Status: DISCONTINUED | OUTPATIENT
Start: 2018-09-26 | End: 2018-09-26

## 2018-09-26 RX ORDER — METOPROLOL TARTRATE 50 MG/1
50 TABLET ORAL 2 TIMES DAILY
Status: DISCONTINUED | OUTPATIENT
Start: 2018-09-26 | End: 2018-09-26

## 2018-09-26 RX ORDER — SODIUM,POTASSIUM PHOSPHATES 280-250MG
2 POWDER IN PACKET (EA) ORAL
Status: DISCONTINUED | OUTPATIENT
Start: 2018-09-26 | End: 2018-09-28 | Stop reason: HOSPADM

## 2018-09-26 RX ORDER — LANOLIN ALCOHOL/MO/W.PET/CERES
400 CREAM (GRAM) TOPICAL 3 TIMES DAILY
Status: DISCONTINUED | OUTPATIENT
Start: 2018-09-26 | End: 2018-09-28 | Stop reason: HOSPADM

## 2018-09-26 RX ORDER — ACETAMINOPHEN 650 MG/20.3ML
650 LIQUID ORAL EVERY 6 HOURS PRN
Status: DISCONTINUED | OUTPATIENT
Start: 2018-09-26 | End: 2018-09-28 | Stop reason: HOSPADM

## 2018-09-26 RX ADMIN — ACETAMINOPHEN 650 MG: 650 SOLUTION ORAL at 11:09

## 2018-09-26 RX ADMIN — ESCITALOPRAM OXALATE 10 MG: 10 TABLET ORAL at 09:09

## 2018-09-26 RX ADMIN — MICONAZOLE NITRATE: 20 OINTMENT TOPICAL at 09:09

## 2018-09-26 RX ADMIN — LEVOTHYROXINE SODIUM 125 MCG: 25 TABLET ORAL at 05:09

## 2018-09-26 RX ADMIN — CILOSTAZOL 50 MG: 50 TABLET ORAL at 09:09

## 2018-09-26 RX ADMIN — CEFTRIAXONE SODIUM 1 G: 1 INJECTION, POWDER, FOR SOLUTION INTRAMUSCULAR; INTRAVENOUS at 09:09

## 2018-09-26 RX ADMIN — ACETAMINOPHEN 650 MG: 650 SOLUTION ORAL at 09:09

## 2018-09-26 RX ADMIN — POTASSIUM & SODIUM PHOSPHATES POWDER PACK 280-160-250 MG 2 PACKET: 280-160-250 PACK at 11:09

## 2018-09-26 RX ADMIN — ACETAMINOPHEN 650 MG: 650 SOLUTION ORAL at 04:09

## 2018-09-26 RX ADMIN — MAGNESIUM OXIDE TAB 400 MG (241.3 MG ELEMENTAL MG) 400 MG: 400 (241.3 MG) TAB at 09:09

## 2018-09-26 RX ADMIN — LISINOPRIL 40 MG: 20 TABLET ORAL at 09:09

## 2018-09-26 RX ADMIN — CILOSTAZOL 50 MG: 50 TABLET ORAL at 11:09

## 2018-09-26 RX ADMIN — CARVEDILOL 25 MG: 25 TABLET, FILM COATED ORAL at 09:09

## 2018-09-26 RX ADMIN — METOPROLOL TARTRATE 50 MG: 50 TABLET ORAL at 09:09

## 2018-09-26 RX ADMIN — THERA TABS 1 TABLET: TAB at 09:09

## 2018-09-26 RX ADMIN — MAGNESIUM OXIDE TAB 400 MG (241.3 MG ELEMENTAL MG) 400 MG: 400 (241.3 MG) TAB at 04:09

## 2018-09-26 RX ADMIN — AMLODIPINE BESYLATE 10 MG: 10 TABLET ORAL at 05:09

## 2018-09-26 RX ADMIN — ENOXAPARIN SODIUM 40 MG: 100 INJECTION SUBCUTANEOUS at 04:09

## 2018-09-26 RX ADMIN — POTASSIUM & SODIUM PHOSPHATES POWDER PACK 280-160-250 MG 2 PACKET: 280-160-250 PACK at 04:09

## 2018-09-26 RX ADMIN — POTASSIUM & SODIUM PHOSPHATES POWDER PACK 280-160-250 MG 2 PACKET: 280-160-250 PACK at 05:09

## 2018-09-26 RX ADMIN — ATORVASTATIN CALCIUM 10 MG: 10 TABLET, FILM COATED ORAL at 09:09

## 2018-09-26 NOTE — CONSULTS
"  [facility]  Adult Nutrition  Consult Note    SUMMARY     Recommendations    Recommendation/Intervention:   1.Recommend changing TF to Diabetisource @ 50ml/hr +100ml water flushes q6hr to better meet Pt needs. Provides 1440kcal, 72g/Pro, 982ml free fl. Meets 100% EEN/EPN.   2.Start w 10ml/hr and increase by 10 ml/hr q2hr as tolerated until goal rate achieved.   3.Hold for residuals > 500ml  4.For 20hr run time increase to 60ml/hr x20hrs  Goals: tolerate TF at goal rate  Nutrition Goal Status: new  Communication of RD Recs: (POC/ Sticky note)    Reason for Assessment    Reason for Assessment: consult  Diagnosis: (Acute metabolic encephalopathy )  Relevant Medical History: Dementia, T2DM, HTN, s/p PEG  Interdisciplinary Rounds: did not attend  General Information Comments: Pt not answering questions appropriately TF running at goal rate (Isosource 1.5 @ 55ml/hr) RN at bed side states no issues with residuals    Nutrition Risk Screen    Nutrition Risk Screen: tube feeding or parenteral nutrition    Nutrition/Diet History    Patient Reported Diet/Restrictions/Preferences: (LIONEL)  Do you have any cultural, spiritual, [employer]aritan conflicts, given your current situation?: LIONEL  Food Allergies: NKFA    Anthropometrics    Temp: 96.9 °F (36.1 °C)  Height Method: Stated  Height: 5' 6" (167.6 cm)  Height (inches): 66 in  Weight Method: Bed Scale  Weight: 58.9 kg (129 lb 13.6 oz)  Weight (lb): 129.85 lb  Ideal Body Weight (IBW), Female: 130 lb  % Ideal Body Weight, Female (lb): 99.88 lb  BMI (Calculated): 21  BMI Grade: 18.5-24.9 - normal       Lab/Procedures/Meds    Pertinent Labs Reviewed: reviewed  Pertinent Labs Comments: Na-134, Glu-112  Pertinent Medications Reviewed: reviewed  Pertinent Medications Comments: statin, MVI    Physical Findings/Assessment    Overall Physical Appearance: advanced age, lethargic, nourished, weak  Tubes: gastrostomy tube  Oral/Mouth Cavity: all teeth present (age " appropriate)  Skin: intact    Estimated/Assessed Needs    Weight Used For Calorie Calculations: 58.9 kg (129 lb 13.6 oz)  Energy Calorie Requirements (kcal): 1315 kcal/d  Energy Need Method: [place]-St Jeor(x 1.2)  Protein Requirements: 71-83g/d  (1.2-1.4g/kg)  Weight Used For Protein Calculations: 58.9 kg (129 lb 13.6 oz)     Fluid Need Method: RDA Method(or Per MD)  RDA Method (mL): 1315  CHO Requirement: 45-50% total intake      Nutrition Prescription Ordered    Current Diet Order: NPO  Current Nutrition Support Formula Ordered: Isosource 1.5  Current Nutrition Support Rate Ordered: 55 (ml)  Current Nutrition Support Frequency Ordered: ml/hr    Evaluation of Received Nutrient/Fluid Intake    Enteral Calories (kcal): 1980  Enteral Protein (gm): 89  Enteral (Free Water) Fluid (mL): 1027  % Kcal Needs: 150  % Protein Needs: 107  I/O: -1.4 L since admit  Energy Calories Required: exceed needs  Protein Required: exceed needs  Fluid Required: exceed needs  Comments: LBM:9/26/18  % Intake of Estimated Energy Needs: 75 - 100 %  % Meal Intake: NPO    Nutrition Risk    Level of Risk/Frequency of Follow-up: low     Assessment and Plan    Nutrition Problem  Excessive Enteral Nutritional Infusion     Related to (etiology):   TF started running at excessive rate    Signs and Symptoms (as evidenced by):   Current TF meet 150% EEN and 107 %    Interventions/Recommendations (treatment strategy):  1.Recommend changing TF to Diabetisource @ 50ml/hr +100ml water flushes q6hr to better meet Pt needs. Provides 1440kcal, 72g/Pro, 982ml free fl. Meets 100% EEN/EPN.   2.Start w 10ml/hr and increase by 10 ml/hr q2hr as tolerated until goal rate achieved.   3.Hold for residuals > 500ml  4.For 20hr run time increase to 60ml/hr x20hrs    Nutrition Diagnosis Status:   New         Monitor and Evaluation    Food and Nutrient Intake: energy intake, food and beverage intake, enteral nutrition intake  Food and Nutrient Administration: diet order,  enteral and parenteral nutrition administration  Anthropometric Measurements: weight, weight change  Biochemical Data, Medical Tests and Procedures: (All labs)  Nutrition-Focused Physical Findings: overall appearance     Nutrition Follow-Up    RD Follow-up?: Yes

## 2018-09-26 NOTE — PLAN OF CARE
Patient has no falls and no respiratory issues. She received her medication and nursing care per MD order. No acute events over night.

## 2018-09-26 NOTE — H&P
Hospital Medicine  History and Physical Exam    Team: [facility] [place] [facility]OSP MED D Devi Ospina MD  Admit Date: 9/25/2018  ELIZA 10/2/2018  Chief complaint: decreased mental status  Patient information was obtained from nursing home and ER records.   Primary care Physician: Moriah Bernal MD  Code status: DNR (Do Not Resuscitate)    HPI:   Pt is a 78 year old bedbound (~2 months) female with a history of dementia, DM2, osteoarthritis, and pressure ulcers (including sacral and lower extremities) with altered mental status. Per her night nurse (Julian Chand, [facility]), her mouth was frothy and foaming overnight, with worsening secretions throughout the day requiring suctioning. BP was 96/48, while breathing was unlabored and O2 saturation normal. Pt was disoriented but verbally responsive yesterday afternoon. Pt was recently hospitalized at [facility] (starting on 8/3/2018) for sepsis secondary to a stage 4 sacral decubitus with purulent drainage and connection to the anus. She received debridement and was treated with vancomycin (discontinued) and zosyn (4.5 grams Q8h for 6 weeks). Had a PICC line placed on 8/17 for IV antibiotics at LTAC. Nursing home denies any recent fever, chills, chest pain, abdominal pain or diarrhea. Other than the increased oral secretions, she has no other respiratory signs including cough or tachypnea or tachycardia. Pt has chronic indwelling dietrich catheter due to non-healing sacral decubitus.    Prior to recent hospitalization, she was cared at home by adult children. Discharged to LTAC for care of sacral decubitus ulcer from 8/17. After was discharged to [facility] to [facility] bed on 9/20/18. Had completed IV antibiotics by that time.     Past Medical History: Patient has a past medical history of Arthritis, Dementia without behavioral disturbance (8/18/2018), Depression (8/18/2018), Diabetes mellitus, Essential hypertension (8/18/2018), Hypertension, Other hyperlipidemia  (8/18/2018), Other specified hypothyroidism (8/18/2018), S/P percutaneous endoscopic gastrostomy (PEG) tube placement (8/18/2018), Status post diverting loop colostomy (8/8/2018), Thyroid disease, and Type 2 diabetes mellitus (8/18/2018).    Past Surgical History: Patient has no past surgical history on file.    Social History: Patient     Family History: Family history is unknown by patient.    Medications:   Prior to Admission medications    Medication Sig Start Date End Date Taking? Authorizing Provider   acetaminophen (TYLENOL) 650 mg/20.3 mL Soln 20.3 mLs (650 mg total) by Per G Tube route every 6 (six) hours as needed. 9/20/18   Zoya Mena NP   albuterol sulfate 90 mcg/actuation AePB Inhale 1 puff into the lungs every 4 (four) hours as needed (shortness of breath). Rescue 6/25/17   Elaine Bee MD   albuterol-ipratropium (DUO-NEB) 2.5 mg-0.5 mg/3 mL nebulizer solution Inhale one vial by nebulization route every 4 (four) hours while awake. Rescue 9/20/18 9/20/19  Zoya Mena NP   albuterol-ipratropium (DUO-NEB) 2.5 mg-0.5 mg/3 mL nebulizer solution Inhale one vial by nebulization route every 6 (six) hours as needed for Wheezing. Rescue 9/20/18 9/20/19  Zoya Mena NP   amLODIPine (NORVASC) 10 MG tablet 1 tablet (10 mg total) by Per G Tube route once daily. 9/20/18   Zoya Mena NP   atorvastatin (LIPITOR) 10 MG tablet 1 tablet (10 mg total) by Per G Tube route every evening. 9/20/18 9/20/19  Zoya Mena NP   cilostazol (PLETAL) 50 MG Tab Take 50 mg by mouth 2 (two) times daily.    Historical Provider, MD   docusate sodium (COLACE) 100 MG capsule 1 capsule (100 mg total) by Per G Tube route 2 (two) times daily. 9/20/18   Zoya Mena NP   escitalopram oxalate (LEXAPRO) 10 MG tablet 1 tablet (10 mg total) by Per G Tube route once daily. 9/20/18 9/20/19  Zoya Mena, NP   famotidine (PEPCID) 20 MG tablet 1 tablet (20 mg total) by Per G Tube route 2 (two) times daily.  9/20/18 9/20/19  Zoya Mena NP   gabapentin (NEURONTIN) 300 MG capsule 1 capsule (300 mg total) by Per G Tube route 3 (three) times daily. 9/20/18 9/20/19  Zoya Mena NP   glucosamine-chondroitin 500-400 mg tablet 1 tablet by Per G Tube route 3 (three) times daily. 9/20/18   Zoya Mena NP   guaifenesin 100 mg/5 ml (ROBITUSSIN) 100 mg/5 mL syrup 10 mLs (200 mg total) by Per G Tube route 4 (four) times daily as needed for Cough or Congestion. 9/20/18 9/30/18  Zoya Mena NP   hydrALAZINE (APRESOLINE) 100 MG tablet Take 1 tablet (100 mg total) by mouth every 8 (eight) hours. 9/20/18 9/20/19  Zoya Mena NP   hydroCHLOROthiazide (HYDRODIURIL) 25 MG tablet 1 tablet (25 mg total) by Per G Tube route 2 (two) times daily. 9/20/18 9/20/19  Zoya Mena NP   HYDROcodone-acetaminophen (NORCO) 5-325 mg per tablet 1 tablet by Per G Tube route every 6 (six) hours as needed for Pain. 9/20/18   Zoya Mena NP   insulin aspart U-100 (NOVOLOG) 100 unit/mL InPn pen Inject 0-5 Units into the skin every 6 (six) hours as needed (Hyperglycemia). 9/20/18 9/20/19  Zoya Mena NP   ketoconazole (NIZORAL) 2 % cream Apply topically 2 (two) times daily. 9/20/18   Zoya Mena NP   levothyroxine (SYNTHROID) 125 MCG tablet 1 tablet (125 mcg total) by Per G Tube route before breakfast. 9/21/18 9/21/19  Zoya Mena NP   lisinopril (PRINIVIL,ZESTRIL) 40 MG tablet 1 tablet (40 mg total) by Per G Tube route once daily. 9/20/18 9/20/19  Zoya Mena NP   lovastatin (MEVACOR) 20 MG tablet Take 20 mg by mouth every evening. 7/18/18   Salvador Terrell MD   magnesium oxide (MAG-OX) 400 mg tablet 1 tablet (400 mg total) by Per G Tube route 3 (three) times daily. 9/20/18   Zoya Mena NP   metoprolol tartrate (LOPRESSOR) 50 MG tablet 1 tablet (50 mg total) by Per G Tube route 2 (two) times daily. 9/20/18 9/20/19  Zoya Mena NP   multivitamin (THERAGRAN) per tablet 1 tablet  by Per G Tube route once daily. 9/20/18   Zoya Mena, NP   potassium chloride 10% (KAYCIEL) 20 mEq/15 mL oral solution 15 mLs (20 mEq total) by Per G Tube route once daily. 9/20/18   Zoya Mena NP   potassium, sodium phosphates (PHOS-NAK) 280-160-250 mg PwPk 2 packets by Per G Tube route 3 (three) times daily before meals. 9/20/18   Zoya Mena NP   senna-docusate 8.6-50 mg (PERICOLACE) 8.6-50 mg per tablet 2 tablets by Per G Tube route once daily. 9/20/18   Zoya Mena NP   traMADol (ULTRAM) 50 mg tablet 1 tablet (50 mg total) by Per G Tube route every 8 (eight) hours. for 10 days 9/20/18 9/30/18  Zoya Mena NP   vitamin D (VITAMIN D3) 1000 units Tab Take 2 tablets (2,000 Units total) by mouth once daily. 9/20/18   Zoya Mena NP       Allergies: Patient has No Known Allergies.    Reported by nursing home, otherwise unable to obtain due to patient's mental status.  Review of Systems   Respiratory: Negative for cough.      PEx  Temp:  [98.1 °F (36.7 °C)-98.6 °F (37 °C)]   Pulse:  [73-84]   Resp:  [11-24]   BP: (111-175)/(53-94)   SpO2:  [93 %-98 %]   Body mass index is 20.96 kg/m².     Physical Exam   Constitutional: She is well-developed, well-nourished, and in no distress. No distress.   Frail appearing elderly lady   HENT:   Clear sputum foaming at mouth   Pulmonary/Chest:   Bilateral coarse upper airway signs (snoring)   Neurological:   Somnolent, arouses to sternal rub only and falls back asleep, unable to follow commands       Intake/Output Summary (Last 24 hours) at 9/26/2018 5593  Last data filed at 9/25/2018 2327  Gross per 24 hour   Intake --   Output 500 ml   Net -500 ml     Recent Labs   Lab  09/20/18   0426  09/25/18   1220   WBC  8.36  7.94   HGB  10.3*  9.9*   HCT  32.6*  31.4*   PLT  432*  352*     Recent Labs   Lab  09/20/18   0426  09/25/18   1220   NA  136  129*   K  4.1  4.5   CL  103  96   CO2  25  25   BUN  26*  31*   CREATININE  0.7  0.7   GLU  131*  112*    CALCIUM  9.1  9.6   MG  1.8   --    PHOS  3.1   --      Recent Labs   Lab  09/20/18   0426  09/25/18   1220   ALKPHOS  75  85   ALT  19  25   AST  17  26   ALBUMIN  2.0*  2.3*   PROT  6.4  6.8   BILITOT  0.2  0.3      Recent Labs   Lab  09/19/18   1205  09/19/18   1650  09/19/18   2349  09/20/18   0557  09/25/18   1322  09/26/18   0252   POCTGLUCOSE  147*  143*  147*  148*  112*  83     No results for input(s): CPK, CPKMB, MB, TROPONINI in the last 72 hours.    Recent Labs      09/25/18   1651   LACTATE  0.7        Active Hospital Problems    Diagnosis  POA    Urinary tract infection without hematuria [N39.0]  Yes      Resolved Hospital Problems   No resolved problems to display.     CT head without contrast (9/25)  No acute large vascular territory infarct or intracranial hemorrhage identified.  Further evaluation/follow-up as warranted.    Right caudate suspected remote lacunar type infarct.  Additional more subtle low-attenuation foci at the anterior limb left internal capsule, right tonya and bilateral thalami concerning for age-indeterminate lacunar type infarcts.  Correlate clinically.    Mild senescent changes as above.    CXR (9/25)  Cardiac size is normal.  Vascular catheter is seen with its tip in the superior vena cava.  Lungs are well aerated and no significant infiltrate is noted.  Minimally increased markings seen at the left lung base.    Overview  Pt is a 78 year old female with a colostomy, PEG tube, multiple ulcers and sacral decubitus ulcer with recent hospitalization who is being treated for sepsis.    Assessment and Plan for Problems addressed today:    Acute metabolic encephalopathy on chronic dementia  UTI due to chronic indwelling dietrich  hyponatremia  · Stop BID HCTZ and hold free water flushes  · Urine sodium, urine creatinine, urine osmolality, serum osmolality  · UA positive for pyuria and bacteruria  · CT is remarkable in multiple areas in age-indeterminate remote lacunar infarct, right  tonya and bilateral thalami, no acute large vascular territory infarct (9/25)  · Encephalopathy likely secondary to UTI  · Ceftriaxone 1 g IV QD (until cultures result)  · Delirium precautions  · No SIRS - give IV fluids bolus, cefepime and vancomycin  · Blood cultures drawn (CXR unremarkable)  · Check TSH    Recent Osteomyelitis of sacrum  stage 4 sacral decubitus ulcers  multiple decubitus ulcers, bilateral lower extremities   · Ulcers at right shin, right lateral leg, right heel, right lateral malleolus, left heel, left distal leg, left lateral heel  · Pt had received daily wound care at nursing home.  · Completed 6 weeks of antibiotics  · CRP 72.7 ESR 65  · Wound care consult    S/p percutaneous endoscopic gastrostomy tube placement   post diverting loop colostomy  · PEG and loop colostomy placed for poor nutritional status and wound healing (8/8/18)  · PEG site care  · Colostomy care  · Pt was to follow up at trauma surgery clinic at [facility] for removal of abdominal bar beneath colostomy 2 weeks from admission to LTAC (near end of August 2018)    · Diabetes mellitus type 2 with HTN and CKD II  · HbA1c 5.4 (7/12/18)  · Routine serum glucoses in low 100s    HLD  · Atorvastatin 10 mg daily    Essential hypertension  · lisinopril 40 mg daily, metoprolol 50 mg BID and amlodipine 10 mg daily  · Holding hydralazine 100 mg TID for now  · Stop HCTZ 25 mg BID due to hyponatremia    Depression  · lexapro 10 mg daily    Osteoarthritis  · PRN acetaminophen    Anemia of chronic infection    Severe protein malnutrition  Continue isosource at 55 mL/h  Prealbumin    hypothroidism - check TSH  Continue levothyroxine 125 mcg daily      DVT PPx:   VTE Risk Mitigation (From admission, onward)        Ordered     enoxaparin injection 40 mg  Daily      09/26/18 0444     IP VTE HIGH RISK PATIENT  Once      09/25/18 1438          Provider Devi Ospina MD      I personally scribed for Devi Ospina MD on 09/26/2018 at 2:23 PM.  Electronically signed by arti Ontiveros on 09/26/2018 at 2:23 PM

## 2018-09-26 NOTE — MEDICAL/APP STUDENT
Hospital Medicine  Progress note    Team: [facility] [place] [facility] MED D Devi Ospina MD  Admit Date: 9/25/2018  ELIZA 10/1/2018  Code status: DNR (Do Not Resuscitate)    Principal Problem:  Acute metabolic encephalopathy    Interval hx:  Pt more alert, complains of right foot pain, tender on palpation    ROS     Respiratory: no cough or shortness of breath  Cardiovascular: no chest pain or palpitations  Gastrointestinal: no nausea or vomiting, no abdominal pain or change in bowel habits  Behavioral/Psych: no depression or anxiety    PEx  Temp:  [97 °F (36.1 °C)-98.6 °F (37 °C)]   Pulse:  [72-86]   Resp:  [11-20]   BP: (114-175)/(53-94)   SpO2:  [89 %-98 %]     Intake/Output Summary (Last 24 hours) at 9/26/2018 1520  Last data filed at 9/26/2018 0600  Gross per 24 hour   Intake --   Output 1350 ml   Net -1350 ml       General Appearance: no acute distress   Heart: regular rate and rhythm  Respiratory: Normal respiratory effort, no crackles   Abdomen: Soft, non-tender; bowel sounds active  Skin: intact. IV sites ok  Neurologic:  No focal numbness or weakness  Mental status: Alert, oriented x 4, affect appropriate   MSK: right  on palpation    Recent Labs   Lab  09/20/18 0426 09/25/18   1220  09/26/18   0637   WBC  8.36  7.94  8.01   HGB  10.3*  9.9*  10.7*   HCT  32.6*  31.4*  33.9*   PLT  432*  352*  386*     Recent Labs   Lab  09/20/18 0426 09/25/18   1220  09/26/18   0637   NA  136  129*  134*   K  4.1  4.5  4.2   CL  103  96  102   CO2  25  25  25   BUN  26*  31*  21   CREATININE  0.7  0.7  0.6   GLU  131*  112*  105   CALCIUM  9.1  9.6  9.3   MG  1.8   --   1.6   PHOS  3.1   --   3.2     Recent Labs   Lab  09/20/18 0426 09/25/18   1220  09/26/18   0637   ALKPHOS  75  85   --    ALT  19  25   --    AST  17  26   --    ALBUMIN  2.0*  2.3*  2.1*   PROT  6.4  6.8   --    BILITOT  0.2  0.3   --       Recent Labs   Lab  09/19/18   1650  09/19/18   2349  09/20/18   0557  09/25/18   1322  09/26/18   0252    POCTGLUCOSE  143*  147*  148*  112*  83     No results for input(s): CPK, CPKMB, MB, TROPONINI in the last 72 hours.    Scheduled Meds:   amLODIPine  10 mg Per G Tube Daily    atorvastatin  10 mg Per G Tube QHS    cefTRIAXone (ROCEPHIN) IVPB  1 g Intravenous Q24H    cilostazol  50 mg Per G Tube BID    enoxaparin  40 mg Subcutaneous Daily    escitalopram oxalate  10 mg Per G Tube Daily    levothyroxine  125 mcg Per G Tube Before breakfast    lisinopril  40 mg Per G Tube Daily    magnesium oxide  400 mg Per G Tube TID    metoprolol tartrate  50 mg Per G Tube BID    miconazole nitrate 2%   Topical (Top) BID    multivitamin  1 tablet Per G Tube Daily    potassium, sodium phosphates  2 packet Per G Tube TID AC     Continuous Infusions:  As Needed:  acetaminophen    Active Hospital Problems    Diagnosis  POA    *Acute metabolic encephalopathy [G93.41]  Yes    Chronic indwelling Lewis catheter [Z92.89]  Not Applicable    Urinary tract infection without hematuria [N39.0]  Yes    Hyponatremia [E87.1]  Yes    Type 2 diabetes mellitus with stage 2 chronic kidney disease and hypertension [E11.22, I12.9, N18.2]  Unknown    Decubitus ulcer of sacral region, stage 4 [L89.154]  Yes    Other specified hypothyroidism [E03.8]  Yes    Severe malnutrition [E43]  Yes    Dementia without behavioral disturbance [F03.90]  Yes    S/P percutaneous endoscopic gastrostomy (PEG) tube placement [Z93.1]  Not Applicable    Status post diverting loop colostomy [Z93.3]  Not Applicable      Resolved Hospital Problems   No resolved problems to display.     CT head without contrast (9/25)  No acute large vascular territory infarct or intracranial hemorrhage identified.  Further evaluation/follow-up as warranted.    Right caudate suspected remote lacunar type infarct.  Additional more subtle low-attenuation foci at the anterior limb left internal capsule, right tonya and bilateral thalami concerning for age-indeterminate lacunar  type infarcts.  Correlate clinically.    Mild senescent changes as above.     CXR (9/25)  Cardiac size is normal.  Vascular catheter is seen with its tip in the superior vena cava.  Lungs are well aerated and no significant infiltrate is noted.  Minimally increased markings seen at the left lung base.         Overview  Pt is a 78 year old female with a colostomy, PEG tube, multiple lower extremity ulcers and sacral decubitus ulcer with recent hospitalization who is being managed for a UTI with severe encephalopathy. Pt has a notable hypothyroidism due to poor absorption of levothyroxine during tube feeds.     Assessment and Plan for Problems addressed today:     Acute metabolic encephalopathy on chronic dementia  UTI due to chronic indwelling dietrich  hyponatremia  · Hyponatremia workup suggests SIADH  Or diuretic use (Urine Na, urine cr, urine osmolality, serum osmolality), stopped HCTZ, held free water flushes  · Encephalopathy workup: CT is remarkable in multiple areas for age-indeterminate remote lacunar infarct, right tonya and bilateral thalami, no acute large vascular territory infarct (9/25), blood cultures NGTD (collected 9/25), CXR clear, no SIRS (discontinued prophylactic antibiotics)  · UA (+) for pyuria and bacturia (likely cause of encephalopathy), urine cultures pending  ? Ceftriaxone 1 g IV QD (until results)  ? Delirium precautions     Recent Osteomyelitis of sacrum  stage 4 sacral decubitus ulcers  multiple decubitus ulcers, bilateral lower extremities   · Ulcers at right shin, right lateral leg, right heel, right lateral malleolus, left heel, left distal leg, left lateral heel  · Daily wound care and completed 6 weeks of antibiotics at nursing home   · CRP 72.7 ESR 65  · Wound care consult does not feel wounds are infected     S/p percutaneous endoscopic gastrostomy tube placement   post diverting loop colostomy  · PEG and loop colostomy for poor nutritional status and wound healing  (8/8/18)  · PEG site care  · Colostomy care  · Pt was to follow up at trauma surgery clinic at [facility] for removal of abdominal bar beneath colostomy 2 weeks from admission to LTAC (near end of August 2018)     Diabetes mellitus type 2 with HTN and CKD II  · HbA1c 5.4 (7/12/18)  · Routine serum glucoses in low 100s     HLD  · Atorvastatin 10 mg daily     Essential hypertension  · lisinopril 40 mg QD, coreg (for better vasodilation), amlodipine 10 mg QD  · Discontinued metoprolol  · Hold hydralazine 100 mg TID for now  · HCTZ held for hyponatremia     Depression  · lexapro 10 mg daily     Osteoarthritis  · PRN acetaminophen     Anemia of chronic infection     Severe protein malnutrition  · Low prealbumin  · Continue isosource at 55 mL/h  · Disagree with dietary recommendations  · No need for specialized formula despite history of DM II - hgbA1c 5.4  · Free water flushes not prescribed due to acute symptomatic hyponatremia     Hypothyroidism, uncontrolled  · TSH 27.72 with normal T4, likely due to poor absorption of medication during administration of tube feeds  · Hold tube feeds for 4 hours for med administration  · Continue levothyroxine 125 mcg daily      Diet:  No diet orders on file  DVT PPx:   VTE Risk Mitigation (From admission, onward)        Ordered     enoxaparin injection 40 mg  Daily      09/26/18 0444     IP VTE HIGH RISK PATIENT  Once      09/25/18 1438          Discharge plan and follow up  [facility]    Provider    Devi Ospina MD    I personally scribed for Devi Ospina MD on 09/26/2018 at 3:20 PM. Electronically signed by arti Ontiveros on 09/26/2018 at 3:20 PM

## 2018-09-26 NOTE — NURSING
There is a 5F double lumen power PICC in place at right upper arm.  Respirations are not labored, but use of accessory muscles is observed, and saturation is 89%.  Will apply oxygen.

## 2018-09-26 NOTE — ED NOTES
Assigned to care for patient at this time.  Report received from Barbi Vega RN.  Patient does not appear to be in any acute distress.  Patient denies any chest pain, SOB, N/V/D or generalized pain.  Will continue to monitor patient for any acute changes or distress.

## 2018-09-26 NOTE — PROGRESS NOTES
A Wound  Consult was received from RN  for sacral wound  The patient was admitted with mouth frothing/foaming and worsening secretions and has a past medical history of dementia, DM, pressure injuries, osteoarthritis.   Upon assessment, the patient has a stage 4 sacral wound and numerous unstageable wounds to the bilateral lower legs/feet.  The sacral stage 4 is red/moist with fibrin noted to wound. Cleansed with sterile normal saline, Triad on gauze placed on wound bed then covered with an ABD dressing/tape to secure. The left lower leg lateral unstageable wound has tan/brown soft slough noted over the wound bed. Cleansed with sterile normal saline, triad on gauze placed over the wound bed and covered with a telfa island dressing. The other unstageable wounds have black eschar covering the wound bed and povidone-iodine solution was painted on the wounds.   The plan of care was discussed with the patient/family and both verbalized understanding.   The Unit Nurse was notified of the care provided and we discussed the treatment plan.  Dr. Ospina was notified of and approved recommendations for care.     Consultant Recommendations:  1.  Sacral stage 4 & left lateral lower leg unstageable wounds--cleanse with sterile normal saline, apply Triad ointment (yellow tube) to gauze then place over the wound beds, cover the sacral wound with ABD/tape and the left leg wound with telfa island dressing BID  2. Bilateral lower legs/feet- paint the black eschar wound with povidone-iodine daily.   3. HAPI bundle--waffle, wedge, heel protectors, turn q 2 hours.     Wound care team to follow prn.     09/26/18 0955       Pressure Injury 08/20/18 0800 Sacral spine Stage 4   Date First Assessed/Time First Assessed: 08/20/18 0800   Pressure Injury Present on Admission: yes  Location: Sacral spine  Staging: Stage 4   Wound Image    Staging 4   Dressing Appearance Intact;Moist drainage   Drainage Amount Small   Drainage Characteristics/Odor  Serosanguineous   Appearance Red;Moist;Fibrin   Tissue loss description Full thickness   Red (%), Wound Tissue Color 100 %   Periwound Area Intact;Dry   Wound Edges Open   Wound Length (cm) 13 cm   Wound Width (cm) 10 cm   Wound Depth (cm) 2 cm   Wound Volume (cm^3) 260 cm^3   Care Cleansed with:;Sterile normal saline;Applied:;Skin Barrier  (triad to gauze, )   Dressing Changed;Gauze;Abd pad   Dressing Change Due 09/26/18       Pressure Injury 08/17/18 2030 Right proximal Weber Unstageable   Date First Assessed/Time First Assessed: 08/17/18 2030   Pressure Injury Present on Admission: yes  Side: Right  Orientation: proximal  Location: Shin  Staging: Unstageable   Wound Image    Staging D   Dressing Appearance Dry;Intact;Clean   Drainage Amount None   Appearance Maroon;Smooth   Periwound Area Intact;Dry   Wound Edges Undefined   Wound Length (cm) 5 cm   Wound Width (cm) 1 cm   Wound Depth (cm) 0 cm   Wound Volume (cm^3) 0 cm^3   Care Applied:;Povidone iodine       Pressure Injury 08/18/18 0049 Left lateral Leg Unstageable   Date First Assessed/Time First Assessed: 08/18/18 0049   Pressure Injury Present on Admission: yes  Side: Left  Orientation: lateral  Location: Leg  Staging: Unstageable   Wound Image    Staging U   Dressing Appearance Dry;Intact;Clean   Drainage Amount None   Appearance Slough  (tan, brown)   Yellow (%), Wound Tissue Color 100 %  (tan/brown)   Periwound Area Intact;Dry   Wound Edges Defined   Wound Length (cm) 7 cm   Wound Width (cm) 2 cm   Wound Depth (cm) 0 cm   Wound Volume (cm^3) 0 cm^3   Care Cleansed with:;Sterile normal saline;Applied:;Skin Barrier  (Triad on gauze)   Dressing Changed;Gauze;Island/border   Dressing Change Due 09/26/18       Pressure Injury 08/18/18 0102 Right Heel Unstageable   Date First Assessed/Time First Assessed: 08/18/18 0102   Pressure Injury Present on Admission: yes  Side: Right  Location: Heel  Staging: Unstageable   Wound Image    Staging U   Dressing Appearance  Dry;Intact;Clean   Drainage Amount None   Appearance Black;Eschar   Black (%), Wound Tissue Color 100 %   Periwound Area Intact;Dry   Wound Edges Defined   Wound Length (cm) 3 cm   Wound Width (cm) 4 cm   Care Applied:;Povidone iodine       Pressure Injury 08/27/18 1155 Right lateral Malleolus Unstageable   Date First Assessed/Time First Assessed: 08/27/18 1155   Pressure Injury Present on Admission: no  Side: Right  Orientation: lateral  Location: Malleolus  Staging: Unstageable   Wound Image    Staging U   Dressing Appearance Dry;Intact;Clean   Drainage Amount None   Appearance Black;Eschar   Black (%), Wound Tissue Color 100 %   Periwound Area Intact;Dry   Wound Edges Defined   Wound Length (cm) 2 cm   Wound Width (cm) 2 cm   Wound Depth (cm) 0 cm   Wound Volume (cm^3) 0 cm^3   Care Applied:;Povidone iodine       Pressure Injury 08/27/18 1155 Left lateral Malleolus Unstageable   Date First Assessed/Time First Assessed: 08/27/18 1155   Pressure Injury Present on Admission: no  Side: Left  Orientation: lateral  Location: Malleolus  Staging: Unstageable   Wound Image    Staging U   Dressing Appearance Dry;Intact;Clean   Drainage Amount None   Appearance Black;Eschar   Black (%), Wound Tissue Color 100 %   Periwound Area Intact;Dry   Wound Edges Defined   Wound Length (cm) 1.5 cm   Wound Width (cm) 1.5 cm   Wound Depth (cm) 0 cm   Wound Volume (cm^3) 0 cm^3   Care Applied:;Povidone iodine       Pressure Injury 09/26/18 0955 Right medial Leg Unstageable   Date First Assessed/Time First Assessed: 09/26/18 0955   Pressure Injury Present on Admission: yes  Side: Right  Orientation: medial  Location: Leg  Staging: Unstageable   Wound Image    Staging U   Dressing Appearance Open to air   Drainage Amount None   Appearance Black;Eschar   Tissue loss description Not applicable   Black (%), Wound Tissue Color 100 %   Periwound Area Intact;Dry   Wound Edges Defined   Wound Length (cm) 2 cm   Wound Width (cm) 2 cm   Wound Depth  (cm) 0 cm   Wound Volume (cm^3) 0 cm^3   Care Applied:;Povidone iodine   Skin Interventions   Pressure Reduction Devices Pressure-redistributing mattress utilized;Heel offloading device utilized;Foam padding utilized   Pressure Reduction Techniques frequent weight shift encouraged;heels elevated off bed;positioned off wounds;weight shift assistance provided   Skin Protection Incontinence pads;Skin sealant/moisture barrier;Urinary containment device

## 2018-09-26 NOTE — PLAN OF CARE
Problem: Patient Care Overview  Goal: Plan of Care Review    Recommendations     Recommendation/Intervention:   1.Recommend changing TF to Diabetisource @ 50ml/hr +100ml water flushes q6hr to better meet Pt needs. Provides 1440kcal, 72g/Pro, 982ml free fl. Meets 100% EEN/EPN.   2.Start w 10ml/hr and increase by 10 ml/hr q2hr as tolerated until goal rate achieved.   3.Hold for residuals > 500ml  4.For 20hr run time increase to 60ml/hr x20hrs  Goals: tolerate TF at goal rate  Nutrition Goal Status: new  Communication of RD Recs: (POC/ Sticky note)     Reason for Assessment     Reason for Assessment: consult  Diagnosis: (Acute metabolic encephalopathy )  Relevant Medical History: Dementia, T2DM, HTN, s/p PEG  Interdisciplinary Rounds: did not attend  General Information Comments: Pt not answering questions appropriately TF running at goal rate (Isosource 1.5 @ 55ml/hr) RN at bed side states no issues with residuals

## 2018-09-27 LAB
ALBUMIN SERPL BCP-MCNC: 2.1 G/DL
ANION GAP SERPL CALC-SCNC: 7 MMOL/L
BACTERIA UR CULT: NORMAL
BUN SERPL-MCNC: 20 MG/DL
CALCIUM SERPL-MCNC: 9.3 MG/DL
CHLORIDE SERPL-SCNC: 103 MMOL/L
CO2 SERPL-SCNC: 27 MMOL/L
CREAT SERPL-MCNC: 0.6 MG/DL
EST. GFR  (AFRICAN AMERICAN): >60 ML/MIN/1.73 M^2
EST. GFR  (NON AFRICAN AMERICAN): >60 ML/MIN/1.73 M^2
ESTIMATED AVG GLUCOSE: 126 MG/DL
GLUCOSE SERPL-MCNC: 127 MG/DL
HBA1C MFR BLD HPLC: 6 %
MAGNESIUM SERPL-MCNC: 1.7 MG/DL
PHOSPHATE SERPL-MCNC: 2.8 MG/DL
POTASSIUM SERPL-SCNC: 4.3 MMOL/L
SODIUM SERPL-SCNC: 137 MMOL/L

## 2018-09-27 PROCEDURE — 36415 COLL VENOUS BLD VENIPUNCTURE: CPT

## 2018-09-27 PROCEDURE — 83735 ASSAY OF MAGNESIUM: CPT

## 2018-09-27 PROCEDURE — 11000001 HC ACUTE MED/SURG PRIVATE ROOM

## 2018-09-27 PROCEDURE — 63600175 PHARM REV CODE 636 W HCPCS: Performed by: INTERNAL MEDICINE

## 2018-09-27 PROCEDURE — 83036 HEMOGLOBIN GLYCOSYLATED A1C: CPT

## 2018-09-27 PROCEDURE — 25000003 PHARM REV CODE 250: Performed by: INTERNAL MEDICINE

## 2018-09-27 PROCEDURE — 80069 RENAL FUNCTION PANEL: CPT

## 2018-09-27 PROCEDURE — 99232 SBSQ HOSP IP/OBS MODERATE 35: CPT | Mod: ,,, | Performed by: INTERNAL MEDICINE

## 2018-09-27 RX ORDER — HYDRALAZINE HYDROCHLORIDE 25 MG/1
25 TABLET, FILM COATED ORAL EVERY 8 HOURS
Status: DISCONTINUED | OUTPATIENT
Start: 2018-09-27 | End: 2018-09-28 | Stop reason: HOSPADM

## 2018-09-27 RX ADMIN — HYDRALAZINE HYDROCHLORIDE 25 MG: 25 TABLET ORAL at 09:09

## 2018-09-27 RX ADMIN — THERA TABS 1 TABLET: TAB at 09:09

## 2018-09-27 RX ADMIN — ESCITALOPRAM OXALATE 10 MG: 10 TABLET ORAL at 09:09

## 2018-09-27 RX ADMIN — LEVOTHYROXINE SODIUM 125 MCG: 25 TABLET ORAL at 06:09

## 2018-09-27 RX ADMIN — LISINOPRIL 40 MG: 20 TABLET ORAL at 09:09

## 2018-09-27 RX ADMIN — POTASSIUM & SODIUM PHOSPHATES POWDER PACK 280-160-250 MG 2 PACKET: 280-160-250 PACK at 06:09

## 2018-09-27 RX ADMIN — CARVEDILOL 25 MG: 25 TABLET, FILM COATED ORAL at 09:09

## 2018-09-27 RX ADMIN — POTASSIUM & SODIUM PHOSPHATES POWDER PACK 280-160-250 MG 2 PACKET: 280-160-250 PACK at 03:09

## 2018-09-27 RX ADMIN — CILOSTAZOL 50 MG: 50 TABLET ORAL at 09:09

## 2018-09-27 RX ADMIN — CEFTRIAXONE SODIUM 1 G: 1 INJECTION, POWDER, FOR SOLUTION INTRAMUSCULAR; INTRAVENOUS at 09:09

## 2018-09-27 RX ADMIN — MAGNESIUM OXIDE TAB 400 MG (241.3 MG ELEMENTAL MG) 400 MG: 400 (241.3 MG) TAB at 03:09

## 2018-09-27 RX ADMIN — AMLODIPINE BESYLATE 10 MG: 10 TABLET ORAL at 09:09

## 2018-09-27 RX ADMIN — POTASSIUM & SODIUM PHOSPHATES POWDER PACK 280-160-250 MG 2 PACKET: 280-160-250 PACK at 11:09

## 2018-09-27 RX ADMIN — MAGNESIUM OXIDE TAB 400 MG (241.3 MG ELEMENTAL MG) 400 MG: 400 (241.3 MG) TAB at 09:09

## 2018-09-27 RX ADMIN — MICONAZOLE NITRATE: 20 OINTMENT TOPICAL at 09:09

## 2018-09-27 RX ADMIN — ENOXAPARIN SODIUM 40 MG: 100 INJECTION SUBCUTANEOUS at 04:09

## 2018-09-27 RX ADMIN — ATORVASTATIN CALCIUM 10 MG: 10 TABLET, FILM COATED ORAL at 09:09

## 2018-09-27 NOTE — PROGRESS NOTES
Hospital Medicine  Progress note    Team: [facility] MED D Devi Ospina MD  Admit Date: 9/25/2018  ELIZA 10/1/2018  Code status: DNR (Do Not Resuscitate)    Principal Problem:  Acute metabolic encephalopathy    Interval hx:  Pt more alert, complains of right foot pain, tender on palpation    ROS     Respiratory: no cough or shortness of breath  Cardiovascular: no chest pain or palpitations  Gastrointestinal: no nausea or vomiting, no abdominal pain or change in bowel habits  Behavioral/Psych: no depression or anxiety    PEx  Temp:  [96.9 °F (36.1 °C)-98.4 °F (36.9 °C)]   Pulse:  [70-86]   Resp:  [16-20]   BP: (123-194)/(63-74)   SpO2:  [89 %-100 %]     Intake/Output Summary (Last 24 hours) at 9/27/2018 0503  Last data filed at 9/27/2018 0000  Gross per 24 hour   Intake 2500 ml   Output 2475 ml   Net 25 ml       General Appearance: no acute distress   Heart: regular rate and rhythm  Respiratory: Normal respiratory effort, no crackles   Abdomen: Soft, non-tender; bowel sounds active  Skin: intact. IV sites ok  Neurologic:  No focal numbness or weakness  Mental status: Alert, oriented x 4, affect appropriate   MSK: right  on palpation    Recent Labs   Lab  09/25/18   1220  09/26/18   0637   WBC  7.94  8.01   HGB  9.9*  10.7*   HCT  31.4*  33.9*   PLT  352*  386*     Recent Labs   Lab  09/25/18   1220  09/26/18   0637   NA  129*  134*   K  4.5  4.2   CL  96  102   CO2  25  25   BUN  31*  21   CREATININE  0.7  0.6   GLU  112*  105   CALCIUM  9.6  9.3   MG   --   1.6   PHOS   --   3.2     Recent Labs   Lab  09/25/18   1220  09/26/18   0637   ALKPHOS  85   --    ALT  25   --    AST  26   --    ALBUMIN  2.3*  2.1*   PROT  6.8   --    BILITOT  0.3   --       Recent Labs   Lab  09/20/18   0557  09/25/18   1322  09/26/18   0252   POCTGLUCOSE  148*  112*  83     No results for input(s): CPK, CPKMB, MB, TROPONINI in the last 72 hours.    Scheduled Meds:   amLODIPine  10 mg Per G Tube Daily    atorvastatin  10 mg Per G  Tube QHS    carvedilol  25 mg Per G Tube BID    cefTRIAXone (ROCEPHIN) IVPB  1 g Intravenous Q24H    cilostazol  50 mg Per G Tube BID    enoxaparin  40 mg Subcutaneous Daily    escitalopram oxalate  10 mg Per G Tube Daily    levothyroxine  125 mcg Per G Tube Before breakfast    lisinopril  40 mg Per G Tube Daily    magnesium oxide  400 mg Per G Tube TID    miconazole nitrate 2%   Topical (Top) BID    multivitamin  1 tablet Per G Tube Daily    potassium, sodium phosphates  2 packet Per G Tube TID AC     Continuous Infusions:  As Needed:  acetaminophen    Active Hospital Problems    Diagnosis  POA    *Acute metabolic encephalopathy [G93.41]  Yes    Chronic indwelling Lewis catheter [Z92.89]  Not Applicable    Unstageable pressure ulcer [L89.95]  Yes    Pressure ulcer of coccygeal region, stage 4 [L89.154]  Yes    Urinary tract infection without hematuria [N39.0]  Yes    Hyponatremia [E87.1]  Yes    Type 2 diabetes mellitus with stage 2 chronic kidney disease and hypertension [E11.22, I12.9, N18.2]  Unknown    Decubitus ulcer of sacral region, stage 4 [L89.154]  Yes    Other specified hypothyroidism [E03.8]  Yes    Severe malnutrition [E43]  Yes    Dementia without behavioral disturbance [F03.90]  Yes    S/P percutaneous endoscopic gastrostomy (PEG) tube placement [Z93.1]  Not Applicable    Status post diverting loop colostomy [Z93.3]  Not Applicable      Resolved Hospital Problems   No resolved problems to display.     CT head without contrast (9/25)  No acute large vascular territory infarct or intracranial hemorrhage identified.  Further evaluation/follow-up as warranted.    Right caudate suspected remote lacunar type infarct.  Additional more subtle low-attenuation foci at the anterior limb left internal capsule, right tonya and bilateral thalami concerning for age-indeterminate lacunar type infarcts.  Correlate clinically.    Mild senescent changes as above.     CXR (9/25)  Cardiac size is  normal.  Vascular catheter is seen with its tip in the superior vena cava.  Lungs are well aerated and no significant infiltrate is noted.  Minimally increased markings seen at the left lung base.         Overview  Pt is a 78 year old female with a colostomy, PEG tube, multiple lower extremity ulcers and sacral decubitus ulcer with recent hospitalization who is being managed for a UTI with severe encephalopathy. Pt has a notable hypothyroidism due to poor absorption of levothyroxine during tube feeds.     Assessment and Plan for Problems addressed today:     Acute metabolic encephalopathy on chronic dementia  UTI due to chronic indwelling dietrich  hyponatremia  · Hyponatremia workup suggests SIADH  Or diuretic use (Urine Na, urine cr, urine osmolality, serum osmolality), stopped HCTZ, held free water flushes  · Encephalopathy workup: CT is remarkable in multiple areas for age-indeterminate remote lacunar infarct, right tonya and bilateral thalami, no acute large vascular territory infarct (9/25), blood cultures NGTD (collected 9/25), CXR clear, no SIRS (discontinued prophylactic antibiotics)  · UA (+) for pyuria and bacturia (likely cause of encephalopathy), urine cultures pending  ? Ceftriaxone 1 g IV QD (until results)  ? Delirium precautions     Recent Osteomyelitis of sacrum  stage 4 sacral decubitus ulcers  multiple decubitus ulcers, bilateral lower extremities   · Ulcers at right shin, right lateral leg, right heel, right lateral malleolus, left heel, left distal leg, left lateral heel  · Daily wound care and completed 6 weeks of antibiotics at nursing home   · CRP 72.7 ESR 65  · Wound care consult does not feel wounds are infected     S/p percutaneous endoscopic gastrostomy tube placement   post diverting loop colostomy  · PEG and loop colostomy for poor nutritional status and wound healing (8/8/18)  · PEG site care  · Colostomy care  · Pt was to follow up at trauma surgery clinic at [facility] for removal of  abdominal bar beneath colostomy 2 weeks from admission to LTAC (near end of August 2018)     Diabetes mellitus type 2 with HTN and CKD II  · HbA1c 5.4 (7/12/18)  · Routine serum glucoses in low 100s     HLD  · Atorvastatin 10 mg daily     Essential hypertension  · lisinopril 40 mg QD, coreg (for better vasodilation), amlodipine 10 mg QD  · Discontinued metoprolol  · Hold hydralazine 100 mg TID for now  · HCTZ held for hyponatremia     Depression  · lexapro 10 mg daily     Osteoarthritis  · PRN acetaminophen     Anemia of chronic infection     Severe protein malnutrition  · Low prealbumin  · Continue isosource at 55 mL/h  · Disagree with dietary recommendations  · No need for specialized formula despite history of DM II - hgbA1c 5.4  · Free water flushes not prescribed due to acute symptomatic hyponatremia     Hypothyroidism, uncontrolled  · TSH 27.72 with normal T4, likely due to poor absorption of medication during administration of tube feeds  · Hold tube feeds for 4 hours for med administration  · Continue levothyroxine 125 mcg daily      Diet:  No diet orders on file  DVT PPx:   VTE Risk Mitigation (From admission, onward)        Ordered     enoxaparin injection 40 mg  Daily      09/26/18 0444     IP VTE HIGH RISK PATIENT  Once      09/25/18 1438          Discharge plan and follow up  [facility]    Provider    Devi Ospina MD    I personally scribed for Devi Ospina MD on 09/27/2018 at 3:20 PM. Electronically signed by arti Ontiveros on 09/27/2018 at 3:20 PM

## 2018-09-27 NOTE — PLAN OF CARE
09/27/18 1541   Discharge Assessment   Assessment Type Discharge Planning Assessment   Assessment information obtained from? Medical Record   Expected Length of Stay (days) 4   Prior to hospitilization cognitive status: Not Oriented to Person;Not Oriented to Place   Prior to hospitalization functional status: Completely Dependent   Current cognitive status: Not Oriented to Person;Not Oriented to Place   Current Functional Status: Completely Dependent   Lives With facility resident  ([facility])   Able to Return to Prior Arrangements yes   Is patient able to care for self after discharge? No   Readmission Within The Last 30 Days current reason for admission unrelated to previous admission   Patient currently being followed by outpatient case management? No   Patient currently receives any other outside agency services? No   Equipment Currently Used at Home hospital bed   Do you have any problems affording any of your prescribed medications? No   Is the patient taking medications as prescribed? yes   Does the patient receive services at the [facility]? No   Discharge Plan A Return to nursing home   Patient/Family In Agreement With Plan yes   Readmission Questionnaire   At the time of your discharge, did someone talk to you about what your health problems were? (pt unable to answer questions appropriately)

## 2018-09-27 NOTE — PLAN OF CARE
Problem: Patient Care Overview  Goal: Plan of Care Review  Outcome: Ongoing (interventions implemented as appropriate)  Pt is AAOX1. Will follow some commands. Speech clear. Wound care performed. T/p q 2 hours with staff and wedge. Tolerating tube feedings well. Colostomy emptying green loose stool. Lewis in place, urine clear yellow. Call light in reach. VSS. Pt is on room air, maintaining sats in 90s. NAD noted. Denies pain.

## 2018-09-27 NOTE — MEDICAL/APP STUDENT
Hospital Medicine  Progress note    Team: [facility] [place] MED D Devi Ospina MD  Admit Date: 9/25/2018  ELIZA 10/1/2018  Code status: DNR (Do Not Resuscitate)    Principal Problem:  Acute metabolic encephalopathy    Interval hx: Nursing note remarked vaginal discharge but on exam found none and no erythema or excoriation surrounding the area.      ROS   Respiratory: no cough or shortness of breath  Cardiovascular: no chest pain or palpitations  Gastrointestinal: no nausea or vomiting, no abdominal pain or change in bowel habits  Behavioral/Psych: no depression or anxiety    PEx  Temp:  [96.9 °F (36.1 °C)-98.4 °F (36.9 °C)]   Pulse:  [70-86]   Resp:  [16-20]   BP: (123-194)/(63-74)   SpO2:  [89 %-100 %]     Intake/Output Summary (Last 24 hours) at 9/27/2018 0740  Last data filed at 9/27/2018 0600  Gross per 24 hour   Intake 2950 ml   Output 2075 ml   Net 875 ml     General Appearance: no acute distress   Heart: regular rate and rhythm  Respiratory: Normal respiratory effort, no crackles   Abdomen: Soft, non-tender; bowel sounds active  Skin: intact. IV sites ok  Neurologic:  No focal numbness or weakness  Mental status: Alert, oriented x 4, affect appropriate   MSK: right  on palpation    Recent Labs   Lab  09/25/18 1220 09/26/18   0637   WBC  7.94  8.01   HGB  9.9*  10.7*   HCT  31.4*  33.9*   PLT  352*  386*     Recent Labs   Lab  09/25/18 1220 09/26/18   0637  09/27/18   0517   NA  129*  134*  137   K  4.5  4.2  4.3   CL  96  102  103   CO2  25  25  27   BUN  31*  21  20   CREATININE  0.7  0.6  0.6   GLU  112*  105  127*   CALCIUM  9.6  9.3  9.3   MG   --   1.6  1.7   PHOS   --   3.2  2.8     Recent Labs   Lab  09/25/18 1220  09/26/18   0637  09/27/18   0517   ALKPHOS  85   --    --    ALT  25   --    --    AST  26   --    --    ALBUMIN  2.3*  2.1*  2.1*   PROT  6.8   --    --    BILITOT  0.3   --    --       Recent Labs   Lab  09/25/18   1322  09/26/18   0252   POCTGLUCOSE  112*  83     Scheduled  Meds:   amLODIPine  10 mg Per G Tube Daily    atorvastatin  10 mg Per G Tube QHS    carvedilol  25 mg Per G Tube BID    cefTRIAXone (ROCEPHIN) IVPB  1 g Intravenous Q24H    cilostazol  50 mg Per G Tube BID    enoxaparin  40 mg Subcutaneous Daily    escitalopram oxalate  10 mg Per G Tube Daily    levothyroxine  125 mcg Per G Tube Before breakfast    lisinopril  40 mg Per G Tube Daily    magnesium oxide  400 mg Per G Tube TID    miconazole nitrate 2%   Topical (Top) BID    multivitamin  1 tablet Per G Tube Daily    potassium, sodium phosphates  2 packet Per G Tube TID AC     Continuous Infusions:  As Needed:  acetaminophen    Active Hospital Problems    Diagnosis  POA    *Acute metabolic encephalopathy [G93.41]  Yes    Chronic indwelling Lewis catheter [Z92.89]  Not Applicable    Unstageable pressure ulcer [L89.95]  Yes    Pressure ulcer of coccygeal region, stage 4 [L89.154]  Yes    Urinary tract infection without hematuria [N39.0]  Yes    Hyponatremia [E87.1]  Yes    Type 2 diabetes mellitus with stage 2 chronic kidney disease and hypertension [E11.22, I12.9, N18.2]  Unknown    Decubitus ulcer of sacral region, stage 4 [L89.154]  Yes    Other specified hypothyroidism [E03.8]  Yes    Severe malnutrition [E43]  Yes    Dementia without behavioral disturbance [F03.90]  Yes    S/P percutaneous endoscopic gastrostomy (PEG) tube placement [Z93.1]  Not Applicable    Status post diverting loop colostomy [Z93.3]  Not Applicable      Resolved Hospital Problems   No resolved problems to display.     Overview  Pt is a 78 year old female with a colostomy, PEG tube, multiple lower extremity ulcers and sacral decubitus ulcer with recent hospitalization who is being managed for a UTI with severe encephalopathy. Pt has a notable hypothyroidism due to poor absorption of levothyroxine during tube feeds.     Assessment and Plan for Problems addressed today:     Acute metabolic encephalopathy on chronic dementia   UTI due to chronic indwelling dietrich  hyponatremia  · Hyponatremia workup suggests SIADH  Or diuretic use (Urine Na, urine cr, urine osmolality, serum osmolality), stopped HCTZ, held free water flushes  · Encephalopathy workup: CT is remarkable in multiple areas for age-indeterminate remote lacunar infarct, right tonya and bilateral thalami, no acute large vascular territory infarct (9/25), blood cultures NGTD (collected 9/25), CXR clear, no SIRS (discontinued prophylactic antibiotics)  · UA (+) for pyuria and bacturia (likely cause of encephalopathy), receiving ceftriaxone  ? Urine culture shows pseudomonas, sensitivities pending  ? Delirium precautions     Recent Osteomyelitis of sacrum  stage 4 sacral decubitus ulcers  multiple decubitus ulcers, bilateral lower extremities   · Ulcers at right shin, right lateral leg, right heel, right lateral malleolus, left heel, left distal leg, left lateral heel  · Daily wound care and completed 6 weeks of antibiotics at nursing home   · CRP 72.7 ESR 65  · Wound care consult does not feel wounds are infected     S/p percutaneous endoscopic gastrostomy tube placement   post diverting loop colostomy  · PEG and loop colostomy for poor nutritional status and wound healing (8/8/18)  · PEG site care  · Colostomy care     Diabetes mellitus type 2 with HTN and CKD II  · HbA1c 6.0 (9/27/18)  · Routine serum glucoses in low 100s  · accuchecks not needed     HLD  · Atorvastatin 10 mg daily     Essential hypertension  · lisinopril 40 mg QD, coreg (for better vasodilation), amlodipine 10 mg QD  · Discontinued metoprolol  · cotninue hydralazine at 25 mg TID  · Stopped HCTZ due to hyponatremia     Depression  · lexapro 10 mg daily     Osteoarthritis  · PRN acetaminophen     Anemia of chronic infection     Severe protein malnutrition  · Low prealbumin  · Continue isosource at 65 mL/h for 20 hours  · Disagree with dietary recommendations  · No need for specialized formula despite history  of DM II - hgbA1c 5.4  · Free water flushes not prescribed due to acute symptomatic hyponatremia     Hypothyroidism, uncontrolled  · TSH 27.72 with normal T4, likely due to poor absorption of medication during administration of tube feeds  · Hold tube feeds for 4 hours for med administration  · Continue levothyroxine 125 mcg daily      Diet:  No diet orders on file  DVT PPx:   VTE Risk Mitigation (From admission, onward)        Ordered     enoxaparin injection 40 mg  Daily      09/26/18 0444     IP VTE HIGH RISK PATIENT  Once      09/25/18 1438          Discharge plan and follow up  [facility]    Provider Devi Ospina MD    I personally scribed for Devi Ospina MD on 09/27/2018 at 3:20 PM. Electronically signed by arti Ontiveros on 09/27/2018 at 3:20 PM

## 2018-09-28 VITALS
HEIGHT: 66 IN | DIASTOLIC BLOOD PRESSURE: 65 MMHG | HEART RATE: 83 BPM | OXYGEN SATURATION: 88 % | SYSTOLIC BLOOD PRESSURE: 145 MMHG | RESPIRATION RATE: 20 BRPM | BODY MASS INDEX: 20.87 KG/M2 | TEMPERATURE: 99 F | WEIGHT: 129.88 LBS

## 2018-09-28 LAB
ALBUMIN SERPL BCP-MCNC: 2.1 G/DL
ANION GAP SERPL CALC-SCNC: 6 MMOL/L
BUN SERPL-MCNC: 20 MG/DL
CALCIUM SERPL-MCNC: 9.6 MG/DL
CHLORIDE SERPL-SCNC: 103 MMOL/L
CO2 SERPL-SCNC: 28 MMOL/L
CREAT SERPL-MCNC: 0.6 MG/DL
EST. GFR  (AFRICAN AMERICAN): >60 ML/MIN/1.73 M^2
EST. GFR  (NON AFRICAN AMERICAN): >60 ML/MIN/1.73 M^2
GLUCOSE SERPL-MCNC: 146 MG/DL
MAGNESIUM SERPL-MCNC: 1.7 MG/DL
PHOSPHATE SERPL-MCNC: 2.3 MG/DL
POTASSIUM SERPL-SCNC: 4.5 MMOL/L
SODIUM SERPL-SCNC: 137 MMOL/L

## 2018-09-28 PROCEDURE — 63600175 PHARM REV CODE 636 W HCPCS: Performed by: INTERNAL MEDICINE

## 2018-09-28 PROCEDURE — 36415 COLL VENOUS BLD VENIPUNCTURE: CPT

## 2018-09-28 PROCEDURE — 83735 ASSAY OF MAGNESIUM: CPT

## 2018-09-28 PROCEDURE — 25000003 PHARM REV CODE 250: Performed by: INTERNAL MEDICINE

## 2018-09-28 PROCEDURE — 80069 RENAL FUNCTION PANEL: CPT

## 2018-09-28 PROCEDURE — 99239 HOSP IP/OBS DSCHRG MGMT >30: CPT | Mod: ,,, | Performed by: INTERNAL MEDICINE

## 2018-09-28 PROCEDURE — 94761 N-INVAS EAR/PLS OXIMETRY MLT: CPT

## 2018-09-28 RX ORDER — HYDRALAZINE HYDROCHLORIDE 25 MG/1
25 TABLET, FILM COATED ORAL EVERY 8 HOURS
Qty: 90 TABLET | Refills: 11 | Status: SHIPPED | OUTPATIENT
Start: 2018-09-28 | End: 2019-09-28

## 2018-09-28 RX ORDER — CARVEDILOL 25 MG/1
25 TABLET ORAL 2 TIMES DAILY
Qty: 60 TABLET | Refills: 11 | Status: SHIPPED | OUTPATIENT
Start: 2018-09-28 | End: 2019-09-28

## 2018-09-28 RX ORDER — SODIUM,POTASSIUM PHOSPHATES 280-250MG
2 POWDER IN PACKET (EA) ORAL 3 TIMES DAILY
Qty: 60 PACKET | Refills: 0
Start: 2018-09-28

## 2018-09-28 RX ORDER — CIPROFLOXACIN 500 MG/1
500 TABLET ORAL EVERY 12 HOURS
Start: 2018-09-28

## 2018-09-28 RX ORDER — CIPROFLOXACIN 500 MG/1
500 TABLET ORAL EVERY 12 HOURS
Status: DISCONTINUED | OUTPATIENT
Start: 2018-09-28 | End: 2018-09-28 | Stop reason: HOSPADM

## 2018-09-28 RX ADMIN — MAGNESIUM OXIDE TAB 400 MG (241.3 MG ELEMENTAL MG) 400 MG: 400 (241.3 MG) TAB at 04:09

## 2018-09-28 RX ADMIN — LISINOPRIL 40 MG: 20 TABLET ORAL at 08:09

## 2018-09-28 RX ADMIN — ENOXAPARIN SODIUM 40 MG: 100 INJECTION SUBCUTANEOUS at 04:09

## 2018-09-28 RX ADMIN — HYDRALAZINE HYDROCHLORIDE 25 MG: 25 TABLET ORAL at 01:09

## 2018-09-28 RX ADMIN — LEVOTHYROXINE SODIUM 125 MCG: 25 TABLET ORAL at 05:09

## 2018-09-28 RX ADMIN — ESCITALOPRAM OXALATE 10 MG: 10 TABLET ORAL at 08:09

## 2018-09-28 RX ADMIN — CARVEDILOL 25 MG: 25 TABLET, FILM COATED ORAL at 08:09

## 2018-09-28 RX ADMIN — CIPROFLOXACIN 500 MG: 500 TABLET, FILM COATED ORAL at 10:09

## 2018-09-28 RX ADMIN — POTASSIUM & SODIUM PHOSPHATES POWDER PACK 280-160-250 MG 2 PACKET: 280-160-250 PACK at 10:09

## 2018-09-28 RX ADMIN — THERA TABS 1 TABLET: TAB at 08:09

## 2018-09-28 RX ADMIN — CILOSTAZOL 50 MG: 50 TABLET ORAL at 08:09

## 2018-09-28 RX ADMIN — POTASSIUM & SODIUM PHOSPHATES POWDER PACK 280-160-250 MG 2 PACKET: 280-160-250 PACK at 05:09

## 2018-09-28 RX ADMIN — AMLODIPINE BESYLATE 10 MG: 10 TABLET ORAL at 08:09

## 2018-09-28 RX ADMIN — MICONAZOLE NITRATE: 20 OINTMENT TOPICAL at 08:09

## 2018-09-28 RX ADMIN — HYDRALAZINE HYDROCHLORIDE 25 MG: 25 TABLET ORAL at 05:09

## 2018-09-28 RX ADMIN — MAGNESIUM OXIDE TAB 400 MG (241.3 MG ELEMENTAL MG) 400 MG: 400 (241.3 MG) TAB at 08:09

## 2018-09-28 RX ADMIN — POTASSIUM & SODIUM PHOSPHATES POWDER PACK 280-160-250 MG 2 PACKET: 280-160-250 PACK at 04:09

## 2018-09-28 RX ADMIN — ACETAMINOPHEN 650 MG: 650 SOLUTION ORAL at 09:09

## 2018-09-28 NOTE — PROGRESS NOTES
Hospital Medicine  Progress note    Team: [facility] D Devi Ospina MD  Admit Date: 9/25/2018  ELIZA 10/1/2018  Code status: DNR (Do Not Resuscitate)    Principal Problem:  Acute metabolic encephalopathy    Interval hx: Nursing note remarked vaginal discharge but on exam found none and no erythema or excoriation surrounding the area.      ROS   Respiratory: no cough or shortness of breath  Cardiovascular: no chest pain or palpitations  Gastrointestinal: no nausea or vomiting, no abdominal pain or change in bowel habits  Behavioral/Psych: no depression or anxiety    PEx  Temp:  [97.4 °F (36.3 °C)-98.4 °F (36.9 °C)]   Pulse:  [70-92]   Resp:  [14-20]   BP: (123-194)/(63-82)   SpO2:  [92 %-100 %]     Intake/Output Summary (Last 24 hours) at 9/27/2018 2107  Last data filed at 9/27/2018 1800  Gross per 24 hour   Intake 2265 ml   Output 975 ml   Net 1290 ml     General Appearance: no acute distress   Heart: regular rate and rhythm  Respiratory: Normal respiratory effort, no crackles   Abdomen: Soft, non-tender; bowel sounds active  Skin: intact. IV sites ok  Neurologic:  No focal numbness or weakness  Mental status: Alert, oriented x 4, affect appropriate   MSK: right  on palpation    Recent Labs   Lab  09/25/18 1220 09/26/18   0637   WBC  7.94  8.01   HGB  9.9*  10.7*   HCT  31.4*  33.9*   PLT  352*  386*     Recent Labs   Lab  09/25/18 1220 09/26/18   0637  09/27/18   0517   NA  129*  134*  137   K  4.5  4.2  4.3   CL  96  102  103   CO2  25  25  27   BUN  31*  21  20   CREATININE  0.7  0.6  0.6   GLU  112*  105  127*   CALCIUM  9.6  9.3  9.3   MG   --   1.6  1.7   PHOS   --   3.2  2.8     Recent Labs   Lab  09/25/18 1220 09/26/18   0637  09/27/18   0517   ALKPHOS  85   --    --    ALT  25   --    --    AST  26   --    --    ALBUMIN  2.3*  2.1*  2.1*   PROT  6.8   --    --    BILITOT  0.3   --    --       Recent Labs   Lab  09/25/18   1322  09/26/18   0252   POCTGLUCOSE  112*  83     Scheduled  Meds:   amLODIPine  10 mg Per G Tube Daily    atorvastatin  10 mg Per G Tube QHS    carvedilol  25 mg Per G Tube BID    cefTRIAXone (ROCEPHIN) IVPB  1 g Intravenous Q24H    cilostazol  50 mg Per G Tube BID    enoxaparin  40 mg Subcutaneous Daily    escitalopram oxalate  10 mg Per G Tube Daily    hydrALAZINE  25 mg Oral Q8H    levothyroxine  125 mcg Per G Tube Before breakfast    lisinopril  40 mg Per G Tube Daily    magnesium oxide  400 mg Per G Tube TID    miconazole nitrate 2%   Topical (Top) BID    multivitamin  1 tablet Per G Tube Daily    potassium, sodium phosphates  2 packet Per G Tube TID AC     Continuous Infusions:  As Needed:  acetaminophen    Active Hospital Problems    Diagnosis  POA    *Acute metabolic encephalopathy [G93.41]  Yes    Chronic indwelling Lewis catheter [Z92.89]  Not Applicable    Unstageable pressure ulcer [L89.95]  Yes    Pressure ulcer of coccygeal region, stage 4 [L89.154]  Yes    Urinary tract infection without hematuria [N39.0]  Yes    Hyponatremia [E87.1]  Yes    Type 2 diabetes mellitus with stage 2 chronic kidney disease and hypertension [E11.22, I12.9, N18.2]  Unknown    Decubitus ulcer of sacral region, stage 4 [L89.154]  Yes    Other specified hypothyroidism [E03.8]  Yes    Severe malnutrition [E43]  Yes    Dementia without behavioral disturbance [F03.90]  Yes    S/P percutaneous endoscopic gastrostomy (PEG) tube placement [Z93.1]  Not Applicable    Status post diverting loop colostomy [Z93.3]  Not Applicable      Resolved Hospital Problems   No resolved problems to display.     Overview  Pt is a 78 year old female with a colostomy, PEG tube, multiple lower extremity ulcers and sacral decubitus ulcer with recent hospitalization who is being managed for a UTI with severe encephalopathy. Pt has a notable hypothyroidism due to poor absorption of levothyroxine during tube feeds.     Assessment and Plan for Problems addressed today:     Acute metabolic  encephalopathy on chronic dementia  UTI due to chronic indwelling dietrich  hyponatremia  · Hyponatremia workup suggests SIADH  Or diuretic use (Urine Na, urine cr, urine osmolality, serum osmolality), stopped HCTZ, held free water flushes  · Encephalopathy workup: CT is remarkable in multiple areas for age-indeterminate remote lacunar infarct, right tonya and bilateral thalami, no acute large vascular territory infarct (9/25), blood cultures NGTD (collected 9/25), CXR clear, no SIRS (discontinued prophylactic antibiotics)  · UA (+) for pyuria and bacturia (likely cause of encephalopathy), receiving ceftriaxone  ? Urine culture shows pseudomonas, sensitivities pending  ? Delirium precautions     Recent Osteomyelitis of sacrum  stage 4 sacral decubitus ulcers  multiple decubitus ulcers, bilateral lower extremities   · Ulcers at right shin, right lateral leg, right heel, right lateral malleolus, left heel, left distal leg, left lateral heel  · Daily wound care and completed 6 weeks of antibiotics at nursing home   · CRP 72.7 ESR 65  · Wound care consult does not feel wounds are infected     S/p percutaneous endoscopic gastrostomy tube placement   post diverting loop colostomy  · PEG and loop colostomy for poor nutritional status and wound healing (8/8/18)  · PEG site care  · Colostomy care     Diabetes mellitus type 2 with HTN and CKD II  · HbA1c 6.0 (9/27/18)  · Routine serum glucoses in low 100s  · accuchecks not needed     HLD  · Atorvastatin 10 mg daily     Essential hypertension  · lisinopril 40 mg QD, coreg (for better vasodilation), amlodipine 10 mg QD  · Discontinued metoprolol  · cotninue hydralazine at 25 mg TID  · Stopped HCTZ due to hyponatremia     Depression  · lexapro 10 mg daily     Osteoarthritis  · PRN acetaminophen     Anemia of chronic infection     Severe protein malnutrition  · Low prealbumin  · Continue isosource at 65 mL/h for 20 hours  · Disagree with dietary recommendations  · No need for  specialized formula despite history of DM II - hgbA1c 5.4  · Free water flushes not prescribed due to acute symptomatic hyponatremia     Hypothyroidism, uncontrolled  · TSH 27.72 with normal T4, likely due to poor absorption of medication during administration of tube feeds  · Hold tube feeds for 4 hours for med administration  · Continue levothyroxine 125 mcg daily      Diet:  No diet orders on file  DVT PPx:   VTE Risk Mitigation (From admission, onward)        Ordered     enoxaparin injection 40 mg  Daily      09/26/18 0444     IP VTE HIGH RISK PATIENT  Once      09/25/18 1438          Discharge plan and follow up  NH    Provider Devi Ospina MD    I personally scribed for Devi Ospina MD on 09/27/2018 at 3:20 PM. Electronically signed by arti Ontiveros on 09/27/2018 at 3:20 PM

## 2018-09-28 NOTE — PLAN OF CARE
Pt to d/c today and return to [facility], Sw to follow with NH orders and acceptance. Sw did upload NH orders, Sw to follow with acceptance. Sw uploaded d/c summary and left message for room number so Sw can arrange transportation. Pt to d.c to room 13b, nurse to call report to . Sw setup stretcher transport for 1630pm. Nurse aware of d/c time and number for report.

## 2018-09-28 NOTE — MEDICAL/APP STUDENT
Discharge Summary  Hospital Medicine    Attending Provider on Discharge: Devi Ospina MD  [facility] Medicine Team: [facility] [place] [facility] MED D  Date of Admission:  9/25/2018     Date of Discharge:  9/28/2018  Code status: DNR (Do Not Resuscitate)    Active Hospital Problems    Diagnosis  POA    *Acute metabolic encephalopathy [G93.41]  Yes    Chronic indwelling Lewis catheter [Z92.89]  Not Applicable    Unstageable pressure ulcer [L89.95]  Yes    Pressure ulcer of coccygeal region, stage 4 [L89.154]  Yes    Urinary tract infection without hematuria [N39.0]  Yes    Hyponatremia [E87.1]  Yes    Type 2 diabetes mellitus with stage 2 chronic kidney disease and hypertension [E11.22, I12.9, N18.2]  Unknown    Decubitus ulcer of sacral region, stage 4 [L89.154]  Yes    Other specified hypothyroidism [E03.8]  Yes    Severe malnutrition [E43]  Yes    Dementia without behavioral disturbance [F03.90]  Yes    S/P percutaneous endoscopic gastrostomy (PEG) tube placement [Z93.1]  Not Applicable    Status post diverting loop colostomy [Z93.3]  Not Applicable      Resolved Hospital Problems   No resolved problems to display.        HPI  Pt is a 78 year old bedbound (~2 months) female with a history of dementia, DM2, osteoarthritis, and pressure ulcers (including sacral and lower extremities) with altered mental status. Per her night nurse (St. Jennifer Adam LPN), her mouth was frothy and foaming overnight, with worsening secretions throughout the day requiring suctioning. BP was 96/48, while breathing was unlabored and O2 saturation normal. Pt was disoriented but verbally responsive yesterday afternoon. Pt was recently hospitalized at [facility] (starting on 8/3/2018) for sepsis secondary to a stage 4 sacral decubitus with purulent drainage and connection to the anus. She received debridement and was treated with vancomycin (discontinued) and zosyn (4.5 grams Q8h for 6 weeks). Had a PICC line placed on 8/17 for IV antibiotics at  [facility]. Nursing home denies any recent fever, chills, chest pain, abdominal pain or diarrhea. Other than the increased oral secretions, she has no other respiratory signs including cough or tachypnea or tachycardia. Pt has chronic indwelling dietrich catheter due to non-healing sacral decubitus.     Prior to recent hospitalization, she was cared at home by adult children. Discharged to LT[facility] for care of sacral decubitus ulcer from 8/17. After was discharged to [facility] to halfway bed on 9/20/18. Had completed IV antibiotics by that time.     Hospital Course  Pt is a 78 year old female with a colostomy, PEG tube, multiple lower extremity ulcers and sacral decubitus ulcer with recent hospitalization who is being managed for a UTI with severe encephalopathy. Pt has a notable hypothyroidism due to poor absorption of levothyroxine during tube feeds. Urine cultures showed a pseudomonas for which she is being treated with ciprofloxacin (pt has a chronic dietrich). Pt also has extensive ulcerations on bilateral lower extremities as well as a stage 4 decubitus ulcer which wound care does not think is infected. Pt also had a hyponatremia which corrected with fluid restrictions.    Acute metabolic encephalopathy on chronic dementia  UTI due to chronic indwelling dietrich  hyponatremia  · Hyponatremia workup suggests SIADH  Or diuretic use (Urine Na, urine cr, urine osmolality, serum osmolality), stopped HCTZ, held free water flushes. Improved from 129 to 137  · Encephalopathy workup: CT is remarkable in multiple areas for age-indeterminate remote lacunar infarct, right tonya and bilateral thalami, no acute large vascular territory infarct (9/25), blood cultures NGTD (collected 9/25), CXR clear, no SIRS (discontinued empiric antibiotics)  · UA (+) for pyuria and bacturia, receivied ceftriaxone  ? Urine culture shows pseudomonas,  Discharged on ciprofloxacin  ? Delirium precautions     Recent Osteomyelitis of  sacrum  stage 4 sacral decubitus ulcers  multiple decubitus ulcers, bilateral lower extremities   · Ulcers at right shin, right lateral leg, right heel, right lateral malleolus, left heel, left distal leg, left lateral heel  · Daily wound care and completed 6 weeks of antibiotics at nursing home   · CRP 72.7 ESR 65  · Wound care consult does not feel wounds are infected     S/p percutaneous endoscopic gastrostomy tube placement   post diverting loop colostomy  · PEG and loop colostomy for poor nutritional status and wound healing (8/8/18)  · PEG site care  · Colostomy care     Diabetes mellitus type 2 with HTN and CKD II  · HbA1c 6.0 (9/27/18)  · Routine serum glucoses in low 100s  · accuchecks not needed     HLD  · Atorvastatin 10 mg daily     Essential hypertension  · lisinopril 40 mg QD, coreg (for better vasodilation), amlodipine 10 mg QD  § Discontinued metoprolol  · cotninue hydralazine at 25 mg TID  · Stopped HCTZ due to hyponatremia     Depression  · lexapro 10 mg daily     Osteoarthritis  · PRN acetaminophen     Anemia of chronic infection     Severe protein malnutrition  · Low prealbumin  · Continue isosource at 65 mL/h for 20 hours  · Disagree with dietary recommendations  ? No need for specialized formula despite history of DM II - hgbA1c 5.4  ? Free water flushes resumed at discharge fro 400 mL per day     Hypothyroidism, uncontrolled  · TSH 27.72 with normal T4, likely due to poor absorption of medication during administration of tube feeds  · Hold tube feeds for 4 hours for med administration  · Continue levothyroxine 125 mcg daily    Recent Labs   Lab  09/25/18   1220  09/26/18   0637   WBC  7.94  8.01   HGB  9.9*  10.7*   HCT  31.4*  33.9*   PLT  352*  386*     Recent Labs   Lab  09/26/18   0637  09/27/18   0517  09/28/18   0530   NA  134*  137  137   K  4.2  4.3  4.5   CL  102  103  103   CO2  25  27  28   BUN  21  20  20   CREATININE  0.6  0.6  0.6   GLU  105  127*  146*   CALCIUM  9.3  9.3   9.6   MG  1.6  1.7  1.7   PHOS  3.2  2.8  2.3*     Recent Labs   Lab  09/25/18   1220  09/26/18   0637  09/27/18   0517  09/28/18   0530   ALKPHOS  85   --    --    --    ALT  25   --    --    --    AST  26   --    --    --    ALBUMIN  2.3*  2.1*  2.1*  2.1*   PROT  6.8   --    --    --    BILITOT  0.3   --    --    --       Recent Labs   Lab  09/25/18   1322  09/26/18   0252   POCTGLUCOSE  112*  83     Recent Labs      09/25/18   1651   LACTATE  0.7        Procedures: none    Consultants:  Consults (From admission, onward)        Status Ordering Provider     IP consult to case management  Once     Provider:  (Not yet assigned)    Acknowledged ALINE VARELA          Current Discharge Medication List      START taking these medications    Details   carvedilol (COREG) 25 MG tablet 1 tablet (25 mg total) by Per G Tube route 2 (two) times daily.  Qty: 60 tablet, Refills: 11      ciprofloxacin HCl (CIPRO) 500 MG tablet Take 1 tablet (500 mg total) by mouth every 12 (twelve) hours.      miconazole nitrate 2% (MICOTIN) 2 % Oint Apply topically 2 (two) times daily.  Refills: 0         CONTINUE these medications which have CHANGED    Details   hydrALAZINE (APRESOLINE) 25 MG tablet Take 1 tablet (25 mg total) by mouth every 8 (eight) hours.  Qty: 90 tablet, Refills: 11      potassium, sodium phosphates (PHOS-NAK) 280-160-250 mg PwPk 2 packets by Per G Tube route 3 (three) times daily.  Qty: 60 packet, Refills: 0         CONTINUE these medications which have NOT CHANGED    Details   acetaminophen (TYLENOL) 650 mg/20.3 mL Soln 20.3 mLs (650 mg total) by Per G Tube route every 6 (six) hours as needed.      amLODIPine (NORVASC) 10 MG tablet 1 tablet (10 mg total) by Per G Tube route once daily.  Qty: 30 tablet, Refills: 0      atorvastatin (LIPITOR) 10 MG tablet 1 tablet (10 mg total) by Per G Tube route every evening.  Qty: 30 tablet, Refills: 0      cilostazol (PLETAL) 50 MG Tab Take 50 mg by mouth 2 (two) times daily.       escitalopram oxalate (LEXAPRO) 10 MG tablet 1 tablet (10 mg total) by Per G Tube route once daily.  Qty: 30 tablet, Refills: 0      levothyroxine (SYNTHROID) 125 MCG tablet 1 tablet (125 mcg total) by Per G Tube route before breakfast.  Qty: 30 tablet, Refills: 0      lisinopril (PRINIVIL,ZESTRIL) 40 MG tablet 1 tablet (40 mg total) by Per G Tube route once daily.  Qty: 30 tablet, Refills: 1      magnesium oxide (MAG-OX) 400 mg tablet 1 tablet (400 mg total) by Per G Tube route 3 (three) times daily.  Qty: 90 tablet, Refills: 0      multivitamin (THERAGRAN) per tablet 1 tablet by Per G Tube route once daily.  Qty: 1 tablet, Refills: 0         STOP taking these medications       albuterol sulfate 90 mcg/actuation AePB Comments:   Reason for Stopping:         albuterol-ipratropium (DUO-NEB) 2.5 mg-0.5 mg/3 mL nebulizer solution Comments:   Reason for Stopping:         albuterol-ipratropium (DUO-NEB) 2.5 mg-0.5 mg/3 mL nebulizer solution Comments:   Reason for Stopping:         docusate sodium (COLACE) 100 MG capsule Comments:   Reason for Stopping:         famotidine (PEPCID) 20 MG tablet Comments:   Reason for Stopping:         gabapentin (NEURONTIN) 300 MG capsule Comments:   Reason for Stopping:         glucosamine-chondroitin 500-400 mg tablet Comments:   Reason for Stopping:         guaifenesin 100 mg/5 ml (ROBITUSSIN) 100 mg/5 mL syrup Comments:   Reason for Stopping:         hydroCHLOROthiazide (HYDRODIURIL) 25 MG tablet Comments:   Reason for Stopping:         HYDROcodone-acetaminophen (NORCO) 5-325 mg per tablet Comments:   Reason for Stopping:         insulin aspart U-100 (NOVOLOG) 100 unit/mL InPn pen Comments:   Reason for Stopping:         ketoconazole (NIZORAL) 2 % cream Comments:   Reason for Stopping:         lovastatin (MEVACOR) 20 MG tablet Comments:   Reason for Stopping:         metoprolol tartrate (LOPRESSOR) 50 MG tablet Comments:   Reason for Stopping:         potassium chloride 10% (KAYCIEL)  20 mEq/15 mL oral solution Comments:   Reason for Stopping:         senna-docusate 8.6-50 mg (PERICOLACE) 8.6-50 mg per tablet Comments:   Reason for Stopping:         traMADol (ULTRAM) 50 mg tablet Comments:   Reason for Stopping:         vitamin D (VITAMIN D3) 1000 units Tab Comments:   Reason for Stopping:               Discharge Diet:isosource 1.5 grant, 65 mL/h, PEG with Normal Fluid intake of 1500 - 2000 mL per day  Begin levothyroxine at 6 a.m. on an empty stomach.    Activity: up in a chair each morning as tolerated    Discharge Condition: Stable    Disposition: Nursing Facility    Tests pending at the time of discharge: none      Time spent  on the discharge of the patient including review of hospital course with the patient. reviewing discharge medications and arranging follow-up care    Discharge examination Patient was seen and examined on the date of discharge and determined to be suitable for discharge.    Discharge plan and follow up:      No future appointments.    Provider    I personally scribed for Devi Ospina MD on 09/28/2018 at 12:50 PM. Electronically signed by arti Ontiveros on 09/28/2018 at 12:50 PM

## 2018-09-28 NOTE — PLAN OF CARE
[facility]     Department of Hospital Medicine     [address] [place] [address]     [phone] after hours  [phone] fax       NURSING HOME ORDERS    09/28/2018    Admit to Nursing Home:  Regular Bed        Diagnoses:  Active Hospital Problems    Diagnosis  POA    *Acute metabolic encephalopathy [G93.41]  Yes    Chronic indwelling Lewis catheter [Z92.89]  Not Applicable    Unstageable pressure ulcer [L89.95]  Yes    Pressure ulcer of coccygeal region, stage 4 [L89.154]  Yes    Urinary tract infection without hematuria [N39.0]  Yes    Hyponatremia [E87.1]  Yes    Type 2 diabetes mellitus with stage 2 chronic kidney disease and hypertension [E11.22, I12.9, N18.2]  Unknown    Decubitus ulcer of sacral region, stage 4 [L89.154]  Yes    Other specified hypothyroidism [E03.8]  Yes    Severe malnutrition [E43]  Yes    Dementia without behavioral disturbance [F03.90]  Yes    S/P percutaneous endoscopic gastrostomy (PEG) tube placement [Z93.1]  Not Applicable    Status post diverting loop colostomy [Z93.3]  Not Applicable      Resolved Hospital Problems   No resolved problems to display.       Patient is homebound due to:  Acute metabolic encephalopathy    Allergies:Review of patient's allergies indicates:  No Known Allergies    Vitals:  Routine, once monthly      Diet: Isosource 1.5 grant 65 mL/h  From 8AM to 4AM daily to allow levthyroxine to began at 6AM on empty stomach    Acitivities:    - Up in a chair each morning as tolerated    LABS:  Per facility protocol  HgbA1c in one month  TSH in one month    Nursing Precautions:     - Aspiration precautions:             - Total assistance with meals            -  Elevate head of bed 30-45 degrees whenever tube feeds are running            -  Suction at bedside          - Fall precautions per nursing home protocol   - Decubitus precautions:        -  for positioning   - Pressure reducing foam  mattress   - Turn patient every two hours. Use wedge pillows to anchor patient      MISCELLANEOUS CARE:          Colostomy Care:  Empty bag every shift and prn                                             Change and clean site every 48 hours     PEG Care:  Clean site every 24 hours     Lewis Care: Empty Lewis bag every shift.  Change Lewis every month     Routine Skin for Bedridden Patients:  Apply moisture barrier cream to all    skin folds and wet areas in perineal area daily and after baths and                           all bowel movements.    Wound care  Sacral stage 4 & left lateral lower leg unstageable wounds--cleanse with sterile normal saline, apply Triad ointment (yellow tube) to gauze then place over the wound beds, cover the sacral wound with ABD/tape and the left leg wound with telfa island dressing BID    All other wounds on Bilateral lower legs/feet- paint the black eschar wound with povidone-iodine daily.      Medications: Discontinue all previous medication orders, if any. See new list below.     Darlene Deluca   Home Medication Instructions ZANE:[id number]    Printed on:09/28/18 3703   Medication Information                      acetaminophen (TYLENOL) 650 mg/20.3 mL Soln  20.3 mLs (650 mg total) by Per G Tube route every 6 (six) hours as needed.      amLODIPine (NORVASC) 10 MG tablet  1 tablet (10 mg total) by Per G Tube route once daily.   HTN   atorvastatin (LIPITOR) 10 MG tablet  1 tablet (10 mg total) by Per G Tube route every evening.   hyperlipidemia   carvedilol (COREG) 25 MG tablet  1 tablet (25 mg total) by Per G Tube route 2 (two) times daily.   HTN   cilostazol (PLETAL) 50 MG Tab  Take 50 mg by mouth 2 (two) times daily.   PVD   ciprofloxacin HCl (CIPRO) 500 MG tablet  Take 1 tablet (500 mg total) by mouth every 12 (twelve) hours for 10 days. Stop 10/8/2018   Pseudomonal UTI   escitalopram oxalate (LEXAPRO) 10 MG tablet  1 tablet (10 mg total) by Per G Tube route once daily.    depression   hydrALAZINE (APRESOLINE) 50 MG tablet  Take 1 tablet (50 mg total) by mouth every 8 (eight) hours.   HTN   levothyroxine (SYNTHROID) 125 MCG tablet  1 tablet (125 mcg total) by Per G Tube route before breakfast.   Hypothyroidism. Give after tube feeds are off for at least one hour. Do not restart tube feeds until at least 30 minutes after med administration.   lisinopril (PRINIVIL,ZESTRIL) 40 MG tablet  1 tablet (40 mg total) by Per G Tube route once daily.   HTN   magnesium oxide (MAG-OX) 400 mg tablet  1 tablet (400 mg total) by Per G Tube route 3 (three) times daily.   hypomagnesium   miconazole nitrate 2% (MICOTIN) 2 % Oint  Apply topically 2 (two) times daily on perineum and groin and sacral areas   Candidal intertrigo   multivitamin (THERAGRAN) per tablet  1 tablet by Per G Tube route once daily.   nutrition   potassium, sodium phosphates (PHOS-NAK) 280-160-250 mg PwPk  2 packets by Per G Tube route 3 (three) times daily.   hypophosphorus             _________________________________  Devi Ospina MD  09/28/2018

## 2018-09-28 NOTE — MEDICAL/APP STUDENT
Hospital Medicine  Progress note    Team: [facility] [place] [facility] MED D Devi Ospina MD  Admit Date: 9/25/2018  ELIZA 10/1/2018  Code status: DNR (Do Not Resuscitate)    Principal Problem:  Acute metabolic encephalopathy    Interval hx: Nursing note remarked vaginal discharge but on exam found none and no erythema or excoriation surrounding the area.      ROS   Respiratory: no cough or shortness of breath  Cardiovascular: no chest pain or palpitations  Gastrointestinal: no nausea or vomiting, no abdominal pain or change in bowel habits  Behavioral/Psych: no depression or anxiety    PEx  Temp:  [96.6 °F (35.9 °C)-98.8 °F (37.1 °C)]   Pulse:  [79-90]   Resp:  [14-20]   BP: (136-175)/(63-77)   SpO2:  [91 %-93 %]     Intake/Output Summary (Last 24 hours) at 9/28/2018 0810  Last data filed at 9/28/2018 0600  Gross per 24 hour   Intake 2680 ml   Output 300 ml   Net 2380 ml     General Appearance: no acute distress   Heart: regular rate and rhythm  Respiratory: Normal respiratory effort, no crackles   Abdomen: Soft, non-tender; bowel sounds active  Skin: intact. IV sites ok  Neurologic:  No focal numbness or weakness  Mental status: Alert, oriented x 4, affect appropriate   MSK: right  on palpation    Recent Labs   Lab  09/25/18   1220  09/26/18   0637   WBC  7.94  8.01   HGB  9.9*  10.7*   HCT  31.4*  33.9*   PLT  352*  386*     Recent Labs   Lab  09/26/18   0637  09/27/18   0517  09/28/18   0530   NA  134*  137  137   K  4.2  4.3  4.5   CL  102  103  103   CO2  25  27  28   BUN  21  20  20   CREATININE  0.6  0.6  0.6   GLU  105  127*  146*   CALCIUM  9.3  9.3  9.6   MG  1.6  1.7  1.7   PHOS  3.2  2.8  2.3*     Recent Labs   Lab  09/25/18   1220  09/26/18   0637  09/27/18   0517  09/28/18   0530   ALKPHOS  85   --    --    --    ALT  25   --    --    --    AST  26   --    --    --    ALBUMIN  2.3*  2.1*  2.1*  2.1*   PROT  6.8   --    --    --    BILITOT  0.3   --    --    --       Recent Labs   Lab  09/25/18   2601   09/26/18   0252   POCTGLUCOSE  112*  83     Scheduled Meds:   amLODIPine  10 mg Per G Tube Daily    atorvastatin  10 mg Per G Tube QHS    carvedilol  25 mg Per G Tube BID    cefTRIAXone (ROCEPHIN) IVPB  1 g Intravenous Q24H    cilostazol  50 mg Per G Tube BID    enoxaparin  40 mg Subcutaneous Daily    escitalopram oxalate  10 mg Per G Tube Daily    hydrALAZINE  25 mg Oral Q8H    levothyroxine  125 mcg Per G Tube Before breakfast    lisinopril  40 mg Per G Tube Daily    magnesium oxide  400 mg Per G Tube TID    miconazole nitrate 2%   Topical (Top) BID    multivitamin  1 tablet Per G Tube Daily    potassium, sodium phosphates  2 packet Per G Tube TID AC     Continuous Infusions:  As Needed:  acetaminophen    Active Hospital Problems    Diagnosis  POA    *Acute metabolic encephalopathy [G93.41]  Yes    Chronic indwelling Lewis catheter [Z92.89]  Not Applicable    Unstageable pressure ulcer [L89.95]  Yes    Pressure ulcer of coccygeal region, stage 4 [L89.154]  Yes    Urinary tract infection without hematuria [N39.0]  Yes    Hyponatremia [E87.1]  Yes    Type 2 diabetes mellitus with stage 2 chronic kidney disease and hypertension [E11.22, I12.9, N18.2]  Unknown    Decubitus ulcer of sacral region, stage 4 [L89.154]  Yes    Other specified hypothyroidism [E03.8]  Yes    Severe malnutrition [E43]  Yes    Dementia without behavioral disturbance [F03.90]  Yes    S/P percutaneous endoscopic gastrostomy (PEG) tube placement [Z93.1]  Not Applicable    Status post diverting loop colostomy [Z93.3]  Not Applicable      Resolved Hospital Problems   No resolved problems to display.     Overview  Pt is a 78 year old female with a colostomy, PEG tube, multiple lower extremity ulcers and sacral decubitus ulcer with recent hospitalization who is being managed for a UTI with severe encephalopathy. Pt has a notable hypothyroidism due to poor absorption of levothyroxine during tube feeds.     Assessment and  Plan for Problems addressed today:     Acute metabolic encephalopathy on chronic dementia  UTI due to chronic indwelling dietrich  hyponatremia  · Hyponatremia workup suggests SIADH  Or diuretic use (Urine Na, urine cr, urine osmolality, serum osmolality), stopped HCTZ, held free water flushes  · Encephalopathy workup: CT is remarkable in multiple areas for age-indeterminate remote lacunar infarct, right tonya and bilateral thalami, no acute large vascular territory infarct (9/25), blood cultures NGTD (collected 9/25), CXR clear, no SIRS (discontinued prophylactic antibiotics)  · UA (+) for pyuria and bacturia (likely cause of encephalopathy), receiving ceftriaxone  ? Urine culture shows pseudomonas, treating with ciprofloxacin  ? Delirium precautions  ? 400 mL free water flushes daily     Recent Osteomyelitis of sacrum  stage 4 sacral decubitus ulcers  multiple decubitus ulcers, bilateral lower extremities   · Ulcers at right shin, right lateral leg, right heel, right lateral malleolus, left heel, left distal leg, left lateral heel  · Daily wound care and completed 6 weeks of antibiotics at nursing home   · CRP 72.7 ESR 65  · Wound care consult does not feel wounds are infected     S/p percutaneous endoscopic gastrostomy tube placement   post diverting loop colostomy  · PEG and loop colostomy for poor nutritional status and wound healing (8/8/18)  · PEG site care  · Colostomy care     Diabetes mellitus type 2 with HTN and CKD II  · HbA1c 6.0 (9/27/18)  · Routine serum glucoses in low 100s  · accuchecks not needed     HLD  · Atorvastatin 10 mg daily     Essential hypertension  · lisinopril 40 mg QD, coreg (for better vasodilation), amlodipine 10 mg QD  · Discontinued metoprolol  · cotninue hydralazine at 25 mg TID  · Stopped HCTZ due to hyponatremia     Depression  · lexapro 10 mg daily     Osteoarthritis  · PRN acetaminophen     Anemia of chronic infection     Severe protein malnutrition  · Low  prealbumin  · Continue isosource at 65 mL/h for 20 hours  · Disagree with dietary recommendations  · No need for specialized formula despite history of DM II - hgbA1c 5.4  · Free water flushes not prescribed due to acute symptomatic hyponatremia     Hypothyroidism, uncontrolled  · TSH 27.72 with normal T4, likely due to poor absorption of medication during administration of tube feeds   · Hold tube feeds for 4 hours for med administration   · Continue levothyroxine 125 mcg daily      Diet:  No diet orders on file  DVT PPx:   VTE Risk Mitigation (From admission, onward)        Ordered     enoxaparin injection 40 mg  Daily      09/26/18 0444     IP VTE HIGH RISK PATIENT  Once      09/25/18 1438          Discharge plan and follow up  [facility]    Provider Devi Ospina MD    I personally scribed for Devi Ospina MD on 09/28/2018 at 3:20 PM. Electronically signed by arti Ontiveros on 09/28/2018 at 3:20 PM

## 2018-09-28 NOTE — NURSING
REPORT CALLED TO NURSE AT RECEIVING FACILITY. NURSE MADE AWARE THAT PATIENT IS EN ROUTE. VERBALIZED UNDERSTANDING.

## 2018-09-28 NOTE — NURSING
RIGHT PICC LINE SITE REMOVED PER TORB PER PRIMARY TEAM MD. SITE WITHIN NORMAL LIMITS. GAUZE AND COBAN IN PLACE. PATIENT INSTRUCTED TO LEAVE DRESSING IN PLACE FOR 1 HOUR. WILL CONTINUE TO MONITOR.

## 2018-09-30 LAB
BACTERIA BLD CULT: NORMAL

## 2018-10-08 NOTE — DISCHARGE SUMMARY
Physician Attestation for Scribe:  I, Devi Ospina MD, personally performed the services described in this documentation. All medical record entries made by the scribe were at my direction and in my presence.  I have reviewed the chart and agree that the record reflects my personal performance and is accurate and complete.   Devi Ospina MD    Discharge Summary  Hospital Medicine    Attending Provider on Discharge: No att. providers found  Hospital Medicine Team: [facility] [place] [facility] MED D  Date of Admission:  9/25/2018     Date of Discharge:  9/28/20189/28/2018  Code status: DNR (Do Not Resuscitate)    Active Hospital Problems    Diagnosis  POA    *Acute metabolic encephalopathy [G93.41]  Yes    Chronic indwelling Lewis catheter [Z92.89]  Not Applicable    Unstageable pressure ulcer [L89.95]  Yes    Pressure ulcer of coccygeal region, stage 4 [L89.154]  Yes    Urinary tract infection without hematuria [N39.0]  Yes    Hyponatremia [E87.1]  Yes    Type 2 diabetes mellitus with stage 2 chronic kidney disease and hypertension [E11.22, I12.9, N18.2]  Yes    Decubitus ulcer of sacral region, stage 4 [L89.154]  Yes    Other specified hypothyroidism [E03.8]  Yes    Severe malnutrition [E43]  Yes    Dementia without behavioral disturbance [F03.90]  Yes    S/P percutaneous endoscopic gastrostomy (PEG) tube placement [Z93.1]  Not Applicable    Status post diverting loop colostomy [Z93.3]  Not Applicable      Resolved Hospital Problems   No resolved problems to display.        HPI  Pt is a 78 year old bedbound (~2 months) female with a history of dementia, DM2, osteoarthritis, and pressure ulcers (including sacral and lower extremities) with altered mental status. Per her night nurse (St. Jennifer Adam LPN), her mouth was frothy and foaming overnight, with worsening secretions throughout the day requiring suctioning. BP was 96/48, while breathing was unlabored and O2 saturation normal. Pt was disoriented but verbally  responsive yesterday afternoon. Pt was recently hospitalized at [facility] (starting on 8/3/2018) for sepsis secondary to a stage 4 sacral decubitus with purulent drainage and connection to the anus. She received debridement and was treated with vancomycin (discontinued) and zosyn (4.5 grams Q8h for 6 weeks). Had a PICC line placed on 8/17 for IV antibiotics at LTAC. Nursing home denies any recent fever, chills, chest pain, abdominal pain or diarrhea. Other than the increased oral secretions, she has no other respiratory signs including cough or tachypnea or tachycardia. Pt has chronic indwelling dietrich catheter due to non-healing sacral decubitus.     Prior to recent hospitalization, she was cared at home by adult children. Discharged to LTAC for care of sacral decubitus ulcer from 8/17. After was discharged to [facility] to [facility] bed on 9/20/18. Had completed IV antibiotics by that time.     Hospital Course  Pt is a 78 year old female with a colostomy, PEG tube, multiple lower extremity ulcers and sacral decubitus ulcer with recent hospitalization who is being managed for a UTI with severe encephalopathy. Pt has a notable hypothyroidism due to poor absorption of levothyroxine during tube feeds. Urine cultures showed a pseudomonas for which she is being treated with ciprofloxacin (pt has a chronic dietrich). Pt also has extensive ulcerations on bilateral lower extremities as well as a stage 4 decubitus ulcer which wound care does not think is infected. Pt also had a hyponatremia which corrected with fluid restrictions.    Acute metabolic encephalopathy on chronic dementia  UTI due to chronic indwelling dietrich  hyponatremia  · Hyponatremia workup suggests SIADH  Or diuretic use (Urine Na, urine cr, urine osmolality, serum osmolality), stopped HCTZ, held free water flushes. Improved from 129 to 137  · Encephalopathy workup: CT is remarkable in multiple areas for age-indeterminate remote lacunar infarct,  right tonya and bilateral thalami, no acute large vascular territory infarct (9/25), blood cultures NGTD (collected 9/25), CXR clear, no SIRS (discontinued empiric antibiotics)  · UA (+) for pyuria and bacturia, receivied ceftriaxone  ? Urine culture shows pseudomonas,  Discharged on ciprofloxacin  ? Delirium precautions     Recent Osteomyelitis of sacrum  stage 4 sacral decubitus ulcers  multiple decubitus ulcers, bilateral lower extremities   · Ulcers at right shin, right lateral leg, right heel, right lateral malleolus, left heel, left distal leg, left lateral heel  · Daily wound care and completed 6 weeks of antibiotics at nursing home   · CRP 72.7 ESR 65  · Wound care consult does not feel wounds are infected     S/p percutaneous endoscopic gastrostomy tube placement   post diverting loop colostomy  · PEG and loop colostomy for poor nutritional status and wound healing (8/8/18)  · PEG site care  · Colostomy care     Diabetes mellitus type 2 with HTN and CKD II  · HbA1c 6.0 (9/27/18)  · Routine serum glucoses in low 100s  · accuchecks not needed     HLD  · Atorvastatin 10 mg daily     Essential hypertension  · lisinopril 40 mg QD, coreg (for better vasodilation), amlodipine 10 mg QD  § Discontinued metoprolol  · cotninue hydralazine at 25 mg TID  · Stopped HCTZ due to hyponatremia     Depression  · lexapro 10 mg daily     Osteoarthritis  · PRN acetaminophen     Anemia of chronic infection     Severe protein malnutrition  · Low prealbumin  · Continue isosource at 65 mL/h for 20 hours  · Disagree with dietary recommendations  ? No need for specialized formula despite history of DM II - hgbA1c 5.4  ? Free water flushes resumed at discharge fro 400 mL per day     Hypothyroidism, uncontrolled  · TSH 27.72 with normal T4, likely due to poor absorption of medication during administration of tube feeds  · Hold tube feeds for 4 hours for med administration  · Continue levothyroxine 125 mcg daily    No results for  input(s): WBC, HGB, HCT, PLT in the last 168 hours.  No results for input(s): NA, K, CL, CO2, BUN, CREATININE, GLU, CALCIUM, MG, PHOS, LIPASE, AMYLASE in the last 168 hours.  No results for input(s): ALKPHOS, ALT, AST, ALBUMIN, PROT, BILITOT, INR in the last 168 hours.   No results for input(s): POCTGLUCOSE in the last 168 hours.  No results for input(s): LACTATE in the last 72 hours.     Procedures: none    Consultants:      Discharge Medication List as of 9/28/2018 12:19 PM      START taking these medications    Details   carvedilol (COREG) 25 MG tablet 1 tablet (25 mg total) by Per G Tube route 2 (two) times daily., Starting Fri 9/28/2018, Until Sat 9/28/2019, Print      ciprofloxacin HCl (CIPRO) 500 MG tablet Take 1 tablet (500 mg total) by mouth every 12 (twelve) hours., Starting Fri 9/28/2018, No Print      miconazole nitrate 2% (MICOTIN) 2 % Oint Apply topically 2 (two) times daily., Starting Fri 9/28/2018, OTC         CONTINUE these medications which have CHANGED    Details   hydrALAZINE (APRESOLINE) 25 MG tablet Take 1 tablet (25 mg total) by mouth every 8 (eight) hours., Starting Fri 9/28/2018, Until Sat 9/28/2019, Print      potassium, sodium phosphates (PHOS-NAK) 280-160-250 mg PwPk 2 packets by Per G Tube route 3 (three) times daily., Starting Fri 9/28/2018, No Print         CONTINUE these medications which have NOT CHANGED    Details   acetaminophen (TYLENOL) 650 mg/20.3 mL Soln 20.3 mLs (650 mg total) by Per G Tube route every 6 (six) hours as needed., Starting Thu 9/20/2018, OTC      amLODIPine (NORVASC) 10 MG tablet 1 tablet (10 mg total) by Per G Tube route once daily., Starting Thu 9/20/2018, No Print      atorvastatin (LIPITOR) 10 MG tablet 1 tablet (10 mg total) by Per G Tube route every evening., Starting Thu 9/20/2018, Until Fri 9/20/2019, No Print      cilostazol (PLETAL) 50 MG Tab Take 50 mg by mouth 2 (two) times daily., Historical Med      escitalopram oxalate (LEXAPRO) 10 MG tablet 1  tablet (10 mg total) by Per G Tube route once daily., Starting Thu 9/20/2018, Until Fri 9/20/2019, No Print      levothyroxine (SYNTHROID) 125 MCG tablet 1 tablet (125 mcg total) by Per G Tube route before breakfast., Starting Fri 9/21/2018, Until Sat 9/21/2019, Normal      lisinopril (PRINIVIL,ZESTRIL) 40 MG tablet 1 tablet (40 mg total) by Per G Tube route once daily., Starting Thu 9/20/2018, Until Fri 9/20/2019, No Print      magnesium oxide (MAG-OX) 400 mg tablet 1 tablet (400 mg total) by Per G Tube route 3 (three) times daily., Starting Thu 9/20/2018, No Print      multivitamin (THERAGRAN) per tablet 1 tablet by Per G Tube route once daily., Starting Thu 9/20/2018, No Print         STOP taking these medications       albuterol sulfate 90 mcg/actuation AePB Comments:   Reason for Stopping:         albuterol-ipratropium (DUO-NEB) 2.5 mg-0.5 mg/3 mL nebulizer solution Comments:   Reason for Stopping:         albuterol-ipratropium (DUO-NEB) 2.5 mg-0.5 mg/3 mL nebulizer solution Comments:   Reason for Stopping:         docusate sodium (COLACE) 100 MG capsule Comments:   Reason for Stopping:         famotidine (PEPCID) 20 MG tablet Comments:   Reason for Stopping:         gabapentin (NEURONTIN) 300 MG capsule Comments:   Reason for Stopping:         glucosamine-chondroitin 500-400 mg tablet Comments:   Reason for Stopping:         guaifenesin 100 mg/5 ml (ROBITUSSIN) 100 mg/5 mL syrup Comments:   Reason for Stopping:         hydroCHLOROthiazide (HYDRODIURIL) 25 MG tablet Comments:   Reason for Stopping:         HYDROcodone-acetaminophen (NORCO) 5-325 mg per tablet Comments:   Reason for Stopping:         insulin aspart U-100 (NOVOLOG) 100 unit/mL InPn pen Comments:   Reason for Stopping:         ketoconazole (NIZORAL) 2 % cream Comments:   Reason for Stopping:         lovastatin (MEVACOR) 20 MG tablet Comments:   Reason for Stopping:         metoprolol tartrate (LOPRESSOR) 50 MG tablet Comments:   Reason for  Stopping:         potassium chloride 10% (KAYCIEL) 20 mEq/15 mL oral solution Comments:   Reason for Stopping:         senna-docusate 8.6-50 mg (PERICOLACE) 8.6-50 mg per tablet Comments:   Reason for Stopping:         traMADol (ULTRAM) 50 mg tablet Comments:   Reason for Stopping:         vitamin D (VITAMIN D3) 1000 units Tab Comments:   Reason for Stopping:               Discharge Diet:isosource 1.5 grant, 65 mL/h, PEG with Normal Fluid intake of 1500 - 2000 mL per day  Begin levothyroxine at 6 a.m. on an empty stomach.    Activity: up in a chair each morning as tolerated    Discharge Condition: Stable    Disposition: Nursing Facility    Tests pending at the time of discharge: none      Time spent  on the discharge of the patient including review of hospital course with the patient. reviewing discharge medications and arranging follow-up care    Discharge examination Patient was seen and examined on the date of discharge and determined to be suitable for discharge.    Discharge plan and follow up:      No future appointments.    Provider    I personally scribed for No att. providers found on 10/08/2018 at 12:50 PM. Electronically signed by arti Ontiveros on 10/08/2018 at 12:50 PM